# Patient Record
Sex: FEMALE | URBAN - METROPOLITAN AREA
[De-identification: names, ages, dates, MRNs, and addresses within clinical notes are randomized per-mention and may not be internally consistent; named-entity substitution may affect disease eponyms.]

---

## 2020-10-17 ENCOUNTER — APPOINTMENT (OUTPATIENT)
Dept: GENERAL RADIOLOGY | Age: 85
DRG: 291 | End: 2020-10-17
Payer: MEDICARE

## 2020-10-17 ENCOUNTER — APPOINTMENT (OUTPATIENT)
Dept: CT IMAGING | Age: 85
DRG: 291 | End: 2020-10-17
Payer: MEDICARE

## 2020-10-17 ENCOUNTER — HOSPITAL ENCOUNTER (INPATIENT)
Age: 85
LOS: 6 days | Discharge: HOME HEALTH | DRG: 291 | End: 2020-10-23
Attending: EMERGENCY MEDICINE | Admitting: INTERNAL MEDICINE
Payer: MEDICARE

## 2020-10-17 PROBLEM — R41.82 ALTERED MENTAL STATE: Status: ACTIVE | Noted: 2020-10-17

## 2020-10-17 LAB
ALBUMIN SERPL-MCNC: 4.1 G/DL (ref 3.4–5)
ALP BLD-CCNC: 118 U/L (ref 40–129)
ALT SERPL-CCNC: 15 U/L (ref 10–40)
ANION GAP SERPL CALCULATED.3IONS-SCNC: 14 MMOL/L (ref 3–16)
AST SERPL-CCNC: 26 U/L (ref 15–37)
BACTERIA: ABNORMAL /HPF
BASE EXCESS VENOUS: 7.1 MMOL/L (ref -2–3)
BASOPHILS ABSOLUTE: 0 K/UL (ref 0–0.2)
BASOPHILS RELATIVE PERCENT: 0.4 %
BILIRUB SERPL-MCNC: 0.5 MG/DL (ref 0–1)
BILIRUBIN DIRECT: <0.2 MG/DL (ref 0–0.3)
BILIRUBIN URINE: NEGATIVE
BILIRUBIN, INDIRECT: NORMAL MG/DL (ref 0–1)
BLOOD, URINE: ABNORMAL
BUN BLDV-MCNC: 15 MG/DL (ref 7–20)
CALCIUM SERPL-MCNC: 9.4 MG/DL (ref 8.3–10.6)
CARBOXYHEMOGLOBIN: 1.9 % (ref 0–1.5)
CHLORIDE BLD-SCNC: 89 MMOL/L (ref 99–110)
CLARITY: CLEAR
CO2: 27 MMOL/L (ref 21–32)
COLOR: YELLOW
CREAT SERPL-MCNC: 1.2 MG/DL (ref 0.6–1.2)
EOSINOPHILS ABSOLUTE: 0 K/UL (ref 0–0.6)
EOSINOPHILS RELATIVE PERCENT: 0.1 %
EPITHELIAL CELLS, UA: ABNORMAL /HPF (ref 0–5)
GFR AFRICAN AMERICAN: 51
GFR NON-AFRICAN AMERICAN: 42
GLUCOSE BLD-MCNC: 126 MG/DL (ref 70–99)
GLUCOSE URINE: NEGATIVE MG/DL
HCO3 VENOUS: 31.2 MMOL/L (ref 24–28)
HCT VFR BLD CALC: 41 % (ref 36–48)
HEMOGLOBIN, VEN, REDUCED: 21.6 %
HEMOGLOBIN: 13.9 G/DL (ref 12–16)
INR BLD: 1.07 (ref 0.86–1.14)
KETONES, URINE: NEGATIVE MG/DL
LACTIC ACID: 1.9 MMOL/L (ref 0.4–2)
LEUKOCYTE ESTERASE, URINE: NEGATIVE
LYMPHOCYTES ABSOLUTE: 0.4 K/UL (ref 1–5.1)
LYMPHOCYTES RELATIVE PERCENT: 5.6 %
MAGNESIUM: 1.9 MG/DL (ref 1.8–2.4)
MCH RBC QN AUTO: 30.9 PG (ref 26–34)
MCHC RBC AUTO-ENTMCNC: 33.8 G/DL (ref 31–36)
MCV RBC AUTO: 91.5 FL (ref 80–100)
METHEMOGLOBIN VENOUS: 0.5 % (ref 0–1.5)
MICROSCOPIC EXAMINATION: YES
MONOCYTES ABSOLUTE: 0.9 K/UL (ref 0–1.3)
MONOCYTES RELATIVE PERCENT: 12.4 %
NEUTROPHILS ABSOLUTE: 5.8 K/UL (ref 1.7–7.7)
NEUTROPHILS RELATIVE PERCENT: 81.5 %
NITRITE, URINE: NEGATIVE
O2 SAT, VEN: 78 %
PCO2, VEN: 42.7 MMHG (ref 41–51)
PDW BLD-RTO: 13.5 % (ref 12.4–15.4)
PH UA: 7.5 (ref 5–8)
PH VENOUS: 7.48 (ref 7.35–7.45)
PLATELET # BLD: 310 K/UL (ref 135–450)
PMV BLD AUTO: 8.7 FL (ref 5–10.5)
PO2, VEN: 42.8 MMHG (ref 25–40)
POTASSIUM REFLEX MAGNESIUM: 3.4 MMOL/L (ref 3.5–5.1)
PRO-BNP: 8504 PG/ML (ref 0–449)
PROTEIN UA: NEGATIVE MG/DL
PROTHROMBIN TIME: 12.4 SEC (ref 10–13.2)
RBC # BLD: 4.48 M/UL (ref 4–5.2)
RBC UA: ABNORMAL /HPF (ref 0–4)
SODIUM BLD-SCNC: 130 MMOL/L (ref 136–145)
SPECIFIC GRAVITY UA: 1.02 (ref 1–1.03)
TCO2 CALC VENOUS: 33 MMOL/L
TOTAL PROTEIN: 7.8 G/DL (ref 6.4–8.2)
TROPONIN: 0.03 NG/ML
URINE TYPE: ABNORMAL
UROBILINOGEN, URINE: 0.2 E.U./DL
WBC # BLD: 7.1 K/UL (ref 4–11)
WBC UA: ABNORMAL /HPF (ref 0–5)

## 2020-10-17 PROCEDURE — 1200000000 HC SEMI PRIVATE

## 2020-10-17 PROCEDURE — 70450 CT HEAD/BRAIN W/O DYE: CPT

## 2020-10-17 PROCEDURE — 93005 ELECTROCARDIOGRAM TRACING: CPT | Performed by: STUDENT IN AN ORGANIZED HEALTH CARE EDUCATION/TRAINING PROGRAM

## 2020-10-17 PROCEDURE — 84443 ASSAY THYROID STIM HORMONE: CPT

## 2020-10-17 PROCEDURE — 83605 ASSAY OF LACTIC ACID: CPT

## 2020-10-17 PROCEDURE — 6360000004 HC RX CONTRAST MEDICATION: Performed by: STUDENT IN AN ORGANIZED HEALTH CARE EDUCATION/TRAINING PROGRAM

## 2020-10-17 PROCEDURE — 71045 X-RAY EXAM CHEST 1 VIEW: CPT

## 2020-10-17 PROCEDURE — 83880 ASSAY OF NATRIURETIC PEPTIDE: CPT

## 2020-10-17 PROCEDURE — 74177 CT ABD & PELVIS W/CONTRAST: CPT

## 2020-10-17 PROCEDURE — 84484 ASSAY OF TROPONIN QUANT: CPT

## 2020-10-17 PROCEDURE — 81001 URINALYSIS AUTO W/SCOPE: CPT

## 2020-10-17 PROCEDURE — 83735 ASSAY OF MAGNESIUM: CPT

## 2020-10-17 PROCEDURE — 85025 COMPLETE CBC W/AUTO DIFF WBC: CPT

## 2020-10-17 PROCEDURE — 73521 X-RAY EXAM HIPS BI 2 VIEWS: CPT

## 2020-10-17 PROCEDURE — 85610 PROTHROMBIN TIME: CPT

## 2020-10-17 PROCEDURE — 82803 BLOOD GASES ANY COMBINATION: CPT

## 2020-10-17 PROCEDURE — 80048 BASIC METABOLIC PNL TOTAL CA: CPT

## 2020-10-17 PROCEDURE — 80076 HEPATIC FUNCTION PANEL: CPT

## 2020-10-17 PROCEDURE — 99285 EMERGENCY DEPT VISIT HI MDM: CPT

## 2020-10-17 RX ORDER — PROMETHAZINE HYDROCHLORIDE 12.5 MG/1
12.5 TABLET ORAL EVERY 6 HOURS PRN
Status: DISCONTINUED | OUTPATIENT
Start: 2020-10-17 | End: 2020-10-23 | Stop reason: HOSPADM

## 2020-10-17 RX ORDER — SODIUM CHLORIDE 0.9 % (FLUSH) 0.9 %
10 SYRINGE (ML) INJECTION PRN
Status: DISCONTINUED | OUTPATIENT
Start: 2020-10-17 | End: 2020-10-23 | Stop reason: HOSPADM

## 2020-10-17 RX ORDER — POTASSIUM CHLORIDE 20 MEQ/1
20 TABLET, EXTENDED RELEASE ORAL 2 TIMES DAILY WITH MEALS
Status: COMPLETED | OUTPATIENT
Start: 2020-10-18 | End: 2020-10-18

## 2020-10-17 RX ORDER — FUROSEMIDE 10 MG/ML
20 INJECTION INTRAMUSCULAR; INTRAVENOUS 2 TIMES DAILY
Status: DISCONTINUED | OUTPATIENT
Start: 2020-10-18 | End: 2020-10-18

## 2020-10-17 RX ORDER — SODIUM CHLORIDE 0.9 % (FLUSH) 0.9 %
10 SYRINGE (ML) INJECTION EVERY 12 HOURS SCHEDULED
Status: DISCONTINUED | OUTPATIENT
Start: 2020-10-18 | End: 2020-10-23 | Stop reason: HOSPADM

## 2020-10-17 RX ORDER — ATORVASTATIN CALCIUM 20 MG/1
20 TABLET, FILM COATED ORAL NIGHTLY
Status: DISCONTINUED | OUTPATIENT
Start: 2020-10-18 | End: 2020-10-23 | Stop reason: HOSPADM

## 2020-10-17 RX ORDER — ONDANSETRON 2 MG/ML
4 INJECTION INTRAMUSCULAR; INTRAVENOUS EVERY 6 HOURS PRN
Status: DISCONTINUED | OUTPATIENT
Start: 2020-10-17 | End: 2020-10-23 | Stop reason: HOSPADM

## 2020-10-17 RX ORDER — ACETAMINOPHEN 325 MG/1
650 TABLET ORAL EVERY 6 HOURS PRN
Status: DISCONTINUED | OUTPATIENT
Start: 2020-10-17 | End: 2020-10-23 | Stop reason: HOSPADM

## 2020-10-17 RX ORDER — POLYETHYLENE GLYCOL 3350 17 G/17G
17 POWDER, FOR SOLUTION ORAL DAILY PRN
Status: DISCONTINUED | OUTPATIENT
Start: 2020-10-17 | End: 2020-10-23 | Stop reason: HOSPADM

## 2020-10-17 RX ORDER — ACETAMINOPHEN 650 MG/1
650 SUPPOSITORY RECTAL EVERY 6 HOURS PRN
Status: DISCONTINUED | OUTPATIENT
Start: 2020-10-17 | End: 2020-10-23 | Stop reason: HOSPADM

## 2020-10-17 RX ADMIN — IOPAMIDOL 80 ML: 755 INJECTION, SOLUTION INTRAVENOUS at 19:12

## 2020-10-17 NOTE — ED NOTES
Bed: A09-09  Expected date:   Expected time:   Means of arrival:   Comments:  Manjula Gallardo RN  10/17/20 5723

## 2020-10-17 NOTE — ED TRIAGE NOTES
HX OF DEMENTIA  Lives with her dtr. Per EMS report dtr went out for \"a couple hours\". upon arrival home, she found patient supine on the floor, patient said she slid to floor. Patient denies injury. Slow to follow commands, oriented X2. Denies pain, denies injury.  BS by [phone]

## 2020-10-17 NOTE — ED PROVIDER NOTES
[address] [facility] RESIDENT NOTE       Date of evaluation: 10/17/2020    Chief Complaint     Fall and Altered Mental Status      History of Present Illness     Elyssa Baxter is a 80 y.o. female medical history of hypothyroidism, chronic kidney disease, hypertension who presents to the emergency department after a suspected fall. Patient lives with her daughter, who was out of the house when patient fell. Per report, patient has been falling more frequently, attempting to stand up from her chair, and falling to the ground. It is unknown if patient sustained head trauma today during this fall as she was found down. She is not on blood thinners. Patient was stood up and ambulated with walker for EMS. She was noted to have a fever to 103 per EMS report. Patient herself is a poor historian, but denies pain at this time. She denies recent cough, vomiting, diarrhea, fevers, chills at home. Daughter arrives at bedside for further collateral information. She describes a gradual decline in patient's level of orientation and ability to care for herself. Daughter describes that patient has been more fatigued than usual. Denies recent symptoms of illness. Describes more frequent falls as above. Daughter is open to beginning discussion about possible care facility for patient. Review of Systems     Please see HPI for pertinent positives and negatives. All other systems reviewed and negative. Past Medical, Surgical, Family, and Social History     She has no past medical history on file. She has no past surgical history on file. Her family history is not on file. She     Medications     Previous Medications    No medications on file       Allergies     She is allergic to aspirin; food; and penicillins.     Physical Exam     INITIAL VITALS: BP: (!) 181/90, Temp: 98.8 °F (37.1 °C), Pulse: 93, Resp: 22, SpO2: 97 %   Physical Exam  HENT:      Head: Normocephalic and atraumatic. Eyes:      Extraocular Movements: Extraocular movements intact. Pupils: Pupils are equal, round, and reactive to light. Neck:      Musculoskeletal: Neck supple. Comments: No c spine tenderness to palpation  Cardiovascular:      Rate and Rhythm: Normal rate and regular rhythm. Pulmonary:      Effort: Pulmonary effort is normal.      Breath sounds: Normal breath sounds. Abdominal:      General: There is no distension. Palpations: Abdomen is soft. Tenderness: There is no abdominal tenderness. Comments: Soft, mobile mass noted extending into right labial area. No overlying erythema   Musculoskeletal:      Comments: Extensive bruising in the bilateral buttocks area extending into the perineum and upper thigh areas. Associated cellulitic skin changes with warmth and redness. Tenderness to lateral compression of the hips. Neurological:      Mental Status: She is alert. Cranial Nerves: No cranial nerve deficit or facial asymmetry. Sensory: No sensory deficit. Motor: No weakness. Comments: Oriented to self but not hospital setting, year, month. Daughter reports this is baseline. DiagnosticResults     EKG   Interpreted in conjunction with emergencydepartment physician Ana Siddiqui MD  Rhythm: normal sinus   Rate: normal  Axis: normal  Ectopy: PACs  Conduction: LVH  ST Segments: normal  T Waves:normal  Q Waves: none  Clinical Impression: left ventricular hypertrophy  Comparison:  None available    RADIOLOGY:  CT Head WO Contrast   Final Result      1. No acute intracranial process. 2.  Moderate cerebral atrophy and mild chronic small vessel ischemic disease. XR CHEST PORTABLE   Final Result      Right pleural effusion and right lower lobe atelectasis as well as possible left upper lobe collapse. XR HIP BILATERAL W AP PELVIS (2 VIEWS)   Final Result   Impression:    No acute osseous injury. Severe left hip osteoarthritis. CT CHEST ABDOMEN PELVIS W CONTRAST    (Results Pending)       LABS:   Results for orders placed or performed during the hospital encounter of 10/17/20   CBC Auto Differential   Result Value Ref Range    WBC 7.1 4.0 - 11.0 K/uL    RBC 4.48 4.00 - 5.20 M/uL    Hemoglobin 13.9 12.0 - 16.0 g/dL    Hematocrit 41.0 36.0 - 48.0 %    MCV 91.5 80.0 - 100.0 fL    MCH 30.9 26.0 - 34.0 pg    MCHC 33.8 31.0 - 36.0 g/dL    RDW 13.5 12.4 - 15.4 %    Platelets 818 239 - 181 K/uL    MPV 8.7 5.0 - 10.5 fL    Neutrophils % 81.5 %    Lymphocytes % 5.6 %    Monocytes % 12.4 %    Eosinophils % 0.1 %    Basophils % 0.4 %    Neutrophils Absolute 5.8 1.7 - 7.7 K/uL    Lymphocytes Absolute 0.4 (L) 1.0 - 5.1 K/uL    Monocytes Absolute 0.9 0.0 - 1.3 K/uL    Eosinophils Absolute 0.0 0.0 - 0.6 K/uL    Basophils Absolute 0.0 0.0 - 0.2 K/uL   Basic Metabolic Panel w/ Reflex to MG   Result Value Ref Range    Sodium 130 (L) 136 - 145 mmol/L    Potassium reflex Magnesium 3.4 (L) 3.5 - 5.1 mmol/L    Chloride 89 (L) 99 - 110 mmol/L    CO2 27 21 - 32 mmol/L    Anion Gap 14 3 - 16    Glucose 126 (H) 70 - 99 mg/dL    BUN 15 7 - 20 mg/dL    CREATININE 1.2 0.6 - 1.2 mg/dL    GFR Non-African American 42 (A) >60    GFR  51 (A) >60    Calcium 9.4 8.3 - 10.6 mg/dL   Hepatic Function Panel   Result Value Ref Range    Total Protein 7.8 6.4 - 8.2 g/dL    Alb 4.1 3.4 - 5.0 g/dL    Alkaline Phosphatase 118 40 - 129 U/L    ALT 15 10 - 40 U/L    AST 26 15 - 37 U/L    Total Bilirubin 0.5 0.0 - 1.0 mg/dL    Bilirubin, Direct <0.2 0.0 - 0.3 mg/dL    Bilirubin, Indirect see below 0.0 - 1.0 mg/dL   Troponin   Result Value Ref Range    Troponin 0.03 (H) <0.01 ng/mL   Brain Natriuretic Peptide   Result Value Ref Range    Pro-BNP 8,504 (H) 0 - 449 pg/mL   Urinalysis, reflex to microscopic   Result Value Ref Range    Color, UA Yellow Straw/Yellow    Clarity, UA Clear Clear    Glucose, Ur Negative Negative mg/dL    Bilirubin Urine Negative Negative    Ketones, Urine Negative Negative mg/dL    Specific Gravity, UA 1.020 1.005 - 1.030    Blood, Urine TRACE-INTACT (A) Negative    pH, UA 7.5 5.0 - 8.0    Protein, UA Negative Negative mg/dL    Urobilinogen, Urine 0.2 <2.0 E.U./dL    Nitrite, Urine Negative Negative    Leukocyte Esterase, Urine Negative Negative    Microscopic Examination YES     Urine Type Other    Lactic Acid, Plasma   Result Value Ref Range    Lactic Acid 1.9 0.4 - 2.0 mmol/L   Blood Gas, Venous   Result Value Ref Range    pH, Ruddy 7.476 (H) 7.350 - 7.450    pCO2, Ruddy 42.7 41.0 - 51.0 mmHg    pO2, Ruddy 42.8 (H) 25.0 - 40.0 mmHg    HCO3, Venous 31.2 (H) 24.0 - 28.0 mmol/L    Base Excess, Ruddy 7.1 (H) -2.0 - 3.0 mmol/L    O2 Sat, Ruddy 78 Not established %    Carboxyhemoglobin 1.9 (H) 0.0 - 1.5 %    MetHgb, Ruddy 0.5 0.0 - 1.5 %    TC02 (Calc), Ruddy 33 mmol/L    Hemoglobin, Ruddy, Reduced 21.60 %   Protime-INR   Result Value Ref Range    Protime 12.4 10.0 - 13.2 sec    INR 1.07 0.86 - 1.14   Magnesium   Result Value Ref Range    Magnesium 1.90 1.80 - 2.40 mg/dL   Microscopic Urinalysis   Result Value Ref Range    WBC, UA 3-5 0 - 5 /HPF    RBC, UA 3-4 0 - 4 /HPF    Epithelial Cells, UA 2-5 0 - 5 /HPF    Bacteria, UA Rare (A) None Seen /HPF       ED BEDSIDE ULTRASOUND:  Brief bedside ultrasound performed to assess mobile mass in right labial area; consistent with hernia. RECENT VITALS:  BP: (!) 167/88, Temp: 98.8 °F (37.1 °C), Pulse: 93,Resp: 22, SpO2: 97 %     Procedures     None    ED Course     Nursing Notes, Past Medical Hx, Past Surgical Hx, Social Hx, Allergies, and Family Hx were reviewed. The patient was given the followingmedications:  Orders Placed This Encounter   Medications    iopamidol (ISOVUE-370) 76 % injection 80 mL       CONSULTS:  None    MEDICAL DECISION MAKING / ASSESSMENT / Uli Thompson is a 80 y.o. female with past medical history of hypothyroidism, CKD, dementia who presents to the emergency department after a fall.   Patient reportedly had a fever to 103 Fahrenheit for EMS, but was afebrile via oral and rectal temperature upon arrival here. Patient denied complaints, but was a poor historian and only oriented to self. Daughter described a progressive decline in orientation and ability to care for herself. Altered mental status work-up was obtained. Lactate was negative. VBG demonstrates respiratory alkalosis. CBC is without leukocytosis or anemia. NT proBNP is elevated to 8500; no priors available for comparison, and Care Everywhere review shows no TTE since 2003. Troponin is 0.03. An EKG demonstrates LVH without acute ischemia or infarction. Patient demonstrates hyponatremia and hypokalemia on BMP. Creatinine appears to be at baseline of 1.2. Hip x-ray demonstrates severe hip osteoarthrosis but no acute fracture. Portable chest x-ray demonstrates right pleural effusion with associated right lower lobe atelectasis as well as possible left upper lobe collapse. CT head demonstrates moderate atrophy but no acute intracranial hemorrhage. Given abnormal findings on chest x-ray and concern for possible mass or mucous plugging causing left upper lobe collapse, CT of the chest was ordered. Given the significant bruising extending into perineum after fall, CT of the abdomen and pelvis is ordered to further assess for traumatic abnormality as well as to assess hernia noted on bedside ultrasound as described above. Patient will require admission given her abnormal findings, troponin of 0.03, elevated BNP, and frequent falls. Discussion regarding care facility will likely need to be performed during this admission as well. This patient was also evaluated by the attending physician. All care plans werediscussed and agreed upon. Clinical Impression     1. Fall, initial encounter        Disposition     PATIENT REFERRED TO:  No follow-up provider specified.     DISCHARGE MEDICATIONS:  New Prescriptions    No medications on file       Sridevi Soto MD  Resident  10/17/20 1970

## 2020-10-17 NOTE — ED PROVIDER NOTES
I assumed care this patient from my colleague Dr. Wandy Canchola. Weekly the history is an 80year-old with history of hypothyroidism, unclear possible heart failure history, history of dementia who had a suspected fall and was found down at home. Was reportedly febrile per EMS, though was not febrile here in the emergency department. Work-up has been notable for a BNP of approximately 9000 without prior comparison, troponin II 0.03, also without prior comparison. Chest x-ray with right-sided pleural effusion questionable atelectasis. Pelvic x-ray notable for prosthetic hip also significant hernia. Physical exam did demonstrate the patient has some possible cellulitic changes in the groin area. At this time a CT of the chest abdomen and pelvis is pending, the patient is likely to be admitted. CT scan ultimately did not show any obvious acute pathology, but given patient's advanced age, BNP elevation without known baseline, and found down, will admit the patient at this time to the hospitalist for further monitoring, evaluation and treatment.       ED Course as of Oct 17 2035   Sat Oct 17, 2020   2016 Potassium(!): 3.4 [BB]   2016 Sodium(!): 130 [BB]      ED Course User Index  [BB] MD Jax Finnegan MD  7:23 PM  10/17/2020       Jax Garcia MD  Resident  10/17/20 9135

## 2020-10-18 LAB
ANION GAP SERPL CALCULATED.3IONS-SCNC: 10 MMOL/L (ref 3–16)
BASOPHILS ABSOLUTE: 0 K/UL (ref 0–0.2)
BASOPHILS RELATIVE PERCENT: 0.6 %
BUN BLDV-MCNC: 14 MG/DL (ref 7–20)
CALCIUM SERPL-MCNC: 9.2 MG/DL (ref 8.3–10.6)
CHLORIDE BLD-SCNC: 91 MMOL/L (ref 99–110)
CO2: 33 MMOL/L (ref 21–32)
CREAT SERPL-MCNC: 1.3 MG/DL (ref 0.6–1.2)
EKG ATRIAL RATE: 85 BPM
EKG ATRIAL RATE: 92 BPM
EKG DIAGNOSIS: NORMAL
EKG DIAGNOSIS: NORMAL
EKG P AXIS: 31 DEGREES
EKG P AXIS: 49 DEGREES
EKG P-R INTERVAL: 152 MS
EKG P-R INTERVAL: 204 MS
EKG Q-T INTERVAL: 386 MS
EKG Q-T INTERVAL: 402 MS
EKG QRS DURATION: 100 MS
EKG QRS DURATION: 98 MS
EKG QTC CALCULATION (BAZETT): 477 MS
EKG QTC CALCULATION (BAZETT): 478 MS
EKG R AXIS: -5 DEGREES
EKG R AXIS: 1 DEGREES
EKG T AXIS: 47 DEGREES
EKG T AXIS: 52 DEGREES
EKG VENTRICULAR RATE: 85 BPM
EKG VENTRICULAR RATE: 92 BPM
EOSINOPHILS ABSOLUTE: 0 K/UL (ref 0–0.6)
EOSINOPHILS RELATIVE PERCENT: 0.1 %
GFR AFRICAN AMERICAN: 47
GFR NON-AFRICAN AMERICAN: 39
GLUCOSE BLD-MCNC: 94 MG/DL (ref 70–99)
HCT VFR BLD CALC: 36.9 % (ref 36–48)
HEMOGLOBIN: 12.6 G/DL (ref 12–16)
LYMPHOCYTES ABSOLUTE: 0.8 K/UL (ref 1–5.1)
LYMPHOCYTES RELATIVE PERCENT: 16.1 %
MAGNESIUM: 2 MG/DL (ref 1.8–2.4)
MCH RBC QN AUTO: 31 PG (ref 26–34)
MCHC RBC AUTO-ENTMCNC: 34 G/DL (ref 31–36)
MCV RBC AUTO: 91 FL (ref 80–100)
MONOCYTES ABSOLUTE: 1 K/UL (ref 0–1.3)
MONOCYTES RELATIVE PERCENT: 19.7 %
NEUTROPHILS ABSOLUTE: 3.2 K/UL (ref 1.7–7.7)
NEUTROPHILS RELATIVE PERCENT: 63.5 %
PDW BLD-RTO: 13.7 % (ref 12.4–15.4)
PLATELET # BLD: 257 K/UL (ref 135–450)
PMV BLD AUTO: 8.6 FL (ref 5–10.5)
POTASSIUM REFLEX MAGNESIUM: 3 MMOL/L (ref 3.5–5.1)
RBC # BLD: 4.06 M/UL (ref 4–5.2)
SODIUM BLD-SCNC: 134 MMOL/L (ref 136–145)
TROPONIN: 0.05 NG/ML
TROPONIN: 0.07 NG/ML
TSH REFLEX: 1.79 UIU/ML (ref 0.27–4.2)
WBC # BLD: 5 K/UL (ref 4–11)

## 2020-10-18 PROCEDURE — 93010 ELECTROCARDIOGRAM REPORT: CPT | Performed by: INTERNAL MEDICINE

## 2020-10-18 PROCEDURE — 92610 EVALUATE SWALLOWING FUNCTION: CPT | Performed by: SPEECH-LANGUAGE PATHOLOGIST

## 2020-10-18 PROCEDURE — 85025 COMPLETE CBC W/AUTO DIFF WBC: CPT

## 2020-10-18 PROCEDURE — 2500000003 HC RX 250 WO HCPCS: Performed by: INTERNAL MEDICINE

## 2020-10-18 PROCEDURE — 1200000000 HC SEMI PRIVATE

## 2020-10-18 PROCEDURE — 6370000000 HC RX 637 (ALT 250 FOR IP): Performed by: INTERNAL MEDICINE

## 2020-10-18 PROCEDURE — 93005 ELECTROCARDIOGRAM TRACING: CPT | Performed by: INTERNAL MEDICINE

## 2020-10-18 PROCEDURE — 2580000003 HC RX 258: Performed by: INTERNAL MEDICINE

## 2020-10-18 PROCEDURE — 36415 COLL VENOUS BLD VENIPUNCTURE: CPT

## 2020-10-18 PROCEDURE — 94150 VITAL CAPACITY TEST: CPT

## 2020-10-18 PROCEDURE — 80048 BASIC METABOLIC PNL TOTAL CA: CPT

## 2020-10-18 PROCEDURE — 6360000002 HC RX W HCPCS: Performed by: INTERNAL MEDICINE

## 2020-10-18 PROCEDURE — 84484 ASSAY OF TROPONIN QUANT: CPT

## 2020-10-18 PROCEDURE — 83735 ASSAY OF MAGNESIUM: CPT

## 2020-10-18 RX ORDER — TORSEMIDE 20 MG/1
60 TABLET ORAL
Status: ON HOLD | COMMUNITY
End: 2020-10-21 | Stop reason: HOSPADM

## 2020-10-18 RX ORDER — M-VIT,TX,IRON,MINS/CALC/FOLIC 27MG-0.4MG
1 TABLET ORAL NIGHTLY
COMMUNITY

## 2020-10-18 RX ORDER — POTASSIUM CHLORIDE 20 MEQ/1
40 TABLET, EXTENDED RELEASE ORAL ONCE
Status: COMPLETED | OUTPATIENT
Start: 2020-10-18 | End: 2020-10-18

## 2020-10-18 RX ORDER — LEVOTHYROXINE SODIUM 0.07 MG/1
75 TABLET ORAL DAILY
COMMUNITY

## 2020-10-18 RX ORDER — POTASSIUM CHLORIDE 20 MEQ/1
20 TABLET, EXTENDED RELEASE ORAL 2 TIMES DAILY WITH MEALS
Status: ON HOLD | COMMUNITY
End: 2020-10-21 | Stop reason: HOSPADM

## 2020-10-18 RX ORDER — PRAVASTATIN SODIUM 20 MG
40 TABLET ORAL NIGHTLY
COMMUNITY

## 2020-10-18 RX ADMIN — POTASSIUM CHLORIDE 20 MEQ: 1500 TABLET, EXTENDED RELEASE ORAL at 09:31

## 2020-10-18 RX ADMIN — MICONAZOLE NITRATE: 20 POWDER TOPICAL at 00:03

## 2020-10-18 RX ADMIN — MICONAZOLE NITRATE: 20 POWDER TOPICAL at 09:38

## 2020-10-18 RX ADMIN — ATORVASTATIN CALCIUM 20 MG: 20 TABLET, FILM COATED ORAL at 00:03

## 2020-10-18 RX ADMIN — MICONAZOLE NITRATE: 20 POWDER TOPICAL at 20:05

## 2020-10-18 RX ADMIN — FUROSEMIDE 20 MG: 20 INJECTION, SOLUTION INTRAMUSCULAR; INTRAVENOUS at 09:31

## 2020-10-18 RX ADMIN — Medication 10 ML: at 09:31

## 2020-10-18 RX ADMIN — POTASSIUM CHLORIDE 20 MEQ: 1500 TABLET, EXTENDED RELEASE ORAL at 20:02

## 2020-10-18 RX ADMIN — POTASSIUM CHLORIDE 40 MEQ: 1500 TABLET, EXTENDED RELEASE ORAL at 13:42

## 2020-10-18 RX ADMIN — ENOXAPARIN SODIUM 30 MG: 30 INJECTION SUBCUTANEOUS at 09:31

## 2020-10-18 RX ADMIN — ATORVASTATIN CALCIUM 20 MG: 20 TABLET, FILM COATED ORAL at 20:02

## 2020-10-18 ASSESSMENT — PAIN SCALES - PAIN ASSESSMENT IN ADVANCED DEMENTIA (PAINAD)
TOTALSCORE: 0
BODYLANGUAGE: 0
FACIALEXPRESSION: 0
FACIALEXPRESSION: 0
BREATHING: 0
TOTALSCORE: 0
CONSOLABILITY: 0
NEGVOCALIZATION: 0
BREATHING: 0
BODYLANGUAGE: 0
NEGVOCALIZATION: 0
CONSOLABILITY: 0

## 2020-10-18 ASSESSMENT — PAIN SCALES - GENERAL
PAINLEVEL_OUTOF10: 0

## 2020-10-18 NOTE — H&P
Hospital Medicine History & Physical      PCP: No primary care provider on file. Date of Admission: 10/17/2020    Date of Service: Pt seen/examined on 10/17/2020 and Admitted to Inpatient with expected LOS greater than two midnights due to medical therapy. Chief Complaint: Fall at home      History Of Present Illness:      80 y.o. female who presents after a fall at home. Patient has presented to ED with her daughter with whom she lives. According to the daughter she has stepped out for couple of hours and when she came back she found her mother/patient supine on the floor. Patient states that she slid and fell. Denies any injury. Patient is slow to respond to the questions, but follow commands appropriately, oriented x2 and her mental status seems at baseline. History of dementia per daughter. Patient's blood sugar checked by EMS was 127. Per report patient has been falling more frequently. She was attempting to stand up from the chair and fell to the ground. The fall was unwitnessed and it is unknown with the patient struck her head or not since patient has history of dementia at baseline. Unknown how long she has been on the floor. According to the daughters history patient is not on any blood thinners or antiplatelets. According to the ER notes patient has ambulated with the walker via EMS and found to have a fever of 103 °F.  Afebrile while in ED. Patient is a poor historian due to dementia. Denies any pain at this time. Denies fever, chills, cough, chest or abdominal pain, nausea, vomiting, diarrhea or constipation, urinary symptoms    Past Medical History:      No past medical history on file. Past medical history is not available and patient is not able to provide reliable history. Past Surgical History:      No past surgical history on file. Unable to obtain from the patient due to baseline dementia.     Medications Prior to Admission:      Prior to Admission medications    Not on File   Need to verify from her pharmacy in the morning    Allergies:  Aspirin; Food; and Penicillins    Social History:    TOBACCO:   has no history on file for tobacco.  ETOH:   has no history on file for alcohol. E-Cigarettes Vaping or Juuling     Questions Responses    Vaping Use     Start Date     Does device contain nicotine? Quit Date     Vaping Type         Family History:    Reviewed in detail and negative for DM, CAD, Cancer, CVA. Positive as follows:    No family history on file. REVIEW OF SYSTEMS:   Patient is unable to provide reliable history due to underlying dementia    PHYSICAL EXAM PERFORMED:    BP (!) 167/68   Pulse 96   Temp 98.8 °F (37.1 °C) (Rectal)   Resp 19   SpO2 97%     General appearance:  No apparent distress, appears stated age and cooperative. Afebrile  HEENT:  Normal cephalic, atraumatic without obvious deformity. Pupils equal, round, and reactive to light. Extra ocular muscles intact. Conjunctivae/corneas clear. Neck: Supple, with full range of motion. No jugular venous distention. Trachea midline. Respiratory:  Normal respiratory effort. Clear to auscultation, bilaterally without Rales/Wheezes/Rhonchi. Diminished breath sounds bilateral bases  Cardiovascular:  Regular rate and rhythm with normal S1/S2 without murmurs, rubs or gallops. Abdomen: Soft, non-tender, non-distended with normal bowel sounds. Soft, swelling in suprapubic area extending into the right inguinal area, no overlying erythema. Musculoskeletal:  No clubbing, cyanosis or edema bilaterally. Full range of motion without deformity. Extensive bruising in the bilateral buttocks extending into the perineum and upper thigh areas. ?  Cellulitic skin changes  Skin: Skin color, texture, turgor normal.  Redness over the genital and perineal area and bilateral inguinal areas  Neurologic:  Neurovascularly intact without any focal sensory/motor deficits.  Cranial nerves: II-XII intact, grossly non-focal.  Psychiatric:  Alert and oriented to self but not to the month, year or the place. According to the daughter this is her baseline. Does not interact or follow commands. Capillary Refill: Brisk,< 3 seconds   Peripheral Pulses: +2 palpable, equal bilaterally       Labs:     Recent Labs     10/17/20  1742   WBC 7.1   HGB 13.9   HCT 41.0        Recent Labs     10/17/20  1742   *   K 3.4*   CL 89*   CO2 27   BUN 15   CREATININE 1.2   CALCIUM 9.4     Recent Labs     10/17/20  1742   AST 26   ALT 15   BILIDIR <0.2   BILITOT 0.5   ALKPHOS 118     Recent Labs     10/17/20  1742   INR 1.07     Recent Labs     10/17/20  1742   TROPONINI 0.03*       Urinalysis:      Lab Results   Component Value Date    NITRU Negative 10/17/2020    WBCUA 3-5 10/17/2020    BACTERIA Rare 10/17/2020    RBCUA 3-4 10/17/2020    BLOODU TRACE-INTACT 10/17/2020    SPECGRAV 1.020 10/17/2020    GLUCOSEU Negative 10/17/2020       Radiology:   EKG:  I have reviewed the EKG with the following interpretation: Sinus rhythm, LVH, PACs-occasional, no acute ST-T changes. CT CHEST ABDOMEN PELVIS W CONTRAST   Final Result      CHEST:      1.  Very large hiatal hernia containing nondilated transverse colon and the entire stomach in the right lower chest.   2.  Mild to moderate right lower lobe atelectasis. No evidence of pneumonia. ABDOMEN/PELVIS:      1.  Large right inguinal hernia containing nondilated distal ileum and proximal colon. No evidence of bowel obstruction. 2.  Cholelithiasis. 3.  Right renal cortical atrophy. 4.  Urinary bladder diverticulum. 5.  Colonic diverticulosis. 6.  Chronic L5 compression fracture with retropulsion resulting in severe L4-5 spinal canal stenosis, similar compared to report from outside hospital MR lumbar spine dated 8/16/2019   7. Severe left hip osteoarthritis. No acute fracture. CT Head WO Contrast   Final Result      1. No acute intracranial process.    2. Moderate cerebral atrophy and mild chronic small vessel ischemic disease. XR CHEST PORTABLE   Final Result      Right pleural effusion and right lower lobe atelectasis as well as possible left upper lobe collapse. XR HIP BILATERAL W AP PELVIS (2 VIEWS)   Final Result   Impression:    No acute osseous injury. Severe left hip osteoarthritis. ASSESSMENT:    Active Hospital Problems    Diagnosis Date Noted    Altered mental state [R41.82] 10/17/2020   - Altered mental state-likely her baseline mental status due to dementia, confirmed by the daughter. UA negative for infection. CT head without acute bleed  - Frequent falls, with unwitnessed fall earlier today  - Elevated proBNP  - Elevated troponin, no prior labs available  - Hyponatremia-?   Secondary to diuretics  - Hypokalemia (patient takes torsemide and a statin at home according to the daughter)  - CKD stage III, atrophic right kidney on CT  - Chronic L5 compression fracture  - Large hiatal hernia  - Asymptomatic cholelithiasis  - Severe left hip osteoarthritis  - Large inguinal hernia    PLAN:  - Patient is allergic to aspirin  - Continue on statins  - Pharmacy consult for home medication verification  - Trend cardiac enzymes x2, if trending up will get cardiology consult  - Repeat EKG in a.m.  - Continue Lasix IV at current doses  - Strict intake and output  - CHF team consulted  - Consider getting echocardiogram  - Replace potassium  - Continue to monitor electrolytes and renal function  - Incentive spirometry  - Would not start on IV antibiotics at this time, will continue to follow clinically and if patient spikes fever or develops leukocytosis will start her on antibiotics  - PT/OT  - Neurochecks every 4 hourly  - Fall precautions  - Antifungal powder to bilateral inguinal areas and perineum twice daily    DVT Prophylaxis: Lovenox  Diet: DIET LOW SODIUM 2 GM; 1500 ml  Code Status: Full Code    PT/OT Eval Status: As tolerated, with assistance and fall precautions    Dispo -PCU with telemetry       Selena Valenzuela MD    Thank you No primary care provider on file. for the opportunity to be involved in this patient's care. If you have any questions or concerns please feel free to contact me at [phone].

## 2020-10-18 NOTE — PROGRESS NOTES
Pt received from ED to room 6312. Vital signs stable; pt alert to self only. Calm, cooperative. Oriented to room and routines. Will apply tele and initiate fall protocols.

## 2020-10-18 NOTE — PLAN OF CARE
Problem: Falls - Risk of:  Goal: Will remain free from falls  Description: Will remain free from falls  10/18/2020 1516 by Dawn Pardo RN  Outcome: Ongoing  Note: Patient at risk for falls. Patient resting quietly in bed, A/O x 1. Side rails up x 3. Bed locked in lowest position. Bed alarm on. Bedside table and call light within reach. Patient instructed to call for assistance. Patient verbalized understanding. Will continue to monitor. Problem: Skin Integrity:  Goal: Absence of new skin breakdown  Description: Absence of new skin breakdown  10/18/2020 1516 by Dawn Pardo RN  Outcome: Ongoing  Note: Pt at risk for skin breakdown. Skin assessment complete. No signs of new skin breakdown. Preexisting skin issues assessed and documented on (see flowsheets). Pts skin cleansed with inc cleanser, external suction catheter in use. Pt repositioned in bed with pillow support, heels elevated off bed. Will order specialty air mattress and continue to monitor.       Problem: Confusion - Acute:  Goal: Mental status will be restored to baseline  Description: Mental status will be restored to baseline  10/18/2020 1516 by Dawn Pardo RN  Outcome: Ongoing

## 2020-10-18 NOTE — CONSULTS
Clinical Pharmacy Progress Note  Pharmacy to assist with Home Medication information    Admit date: 10/17/2020     Subjective/Objective:  Patient is an 80yr old female from home admitted after a fall from home. Pharmacy has been asked by Dr. Adrienne Caceres to assist with obtaining home medication information. Assessment/Plan:    1. Home Medication information:    · Called the patient's daughter, Robert Do [phone]. LM asking for a callback. Per notes, patient is not a good historian. · Used pharmacy fills (which are all recent), and this corresponds nicely with last PCP Office visit on 7/29/20. Added these to the medication list.    · Home Medication List in Epic is complete. If daughter calls back, I will addend this note with any additional information. Daughter, Robert Do, called back -   All meds entered are correct. Adjusted times as per when daughter gives them. List complete. A Perfect Serve message will be sent to the dayteam to discuss the updated [address] List.      Thank you, please call with questions.    Jimbo Peters PharmD., BCPS   10/18/2020 10:23 AM  Wireless: [phone]

## 2020-10-18 NOTE — PLAN OF CARE
Bedside swallow evaluation completed. Please refer to EMR.     Thank you,    Declan Montalvo) [facility], [place], [place]; [phone]  Speech-Language Pathologist

## 2020-10-18 NOTE — PROGRESS NOTES
Patient's daughter visiting and updated on patient and plan of care. She also brought POA, HPOA, and living will documents; copies made and placed in chart.

## 2020-10-18 NOTE — PROGRESS NOTES
Speech Language Pathology  Facility/Department: [address] [place] [address],[address] EVALUATION    NAME: Jasmin Palm  : 1933  MRN: [id number]    ADMISSION DATE: 10/17/2020  ADMITTING DIAGNOSIS: has Altered mental state on their problem list.  ONSET DATE: 10/17/20    Recent Chest Xray/CT of Chest: (10/17/20)  Impression         CHEST:         1.  Very large hiatal hernia containing nondilated transverse colon and the entire stomach in the right lower chest.    2.  Mild to moderate right lower lobe atelectasis. No evidence of pneumonia. Date of Eval: 10/18/2020  Evaluating Therapist: Kashmir Yang    Current Diet level:  Current Diet : NPO  Current Liquid Diet : NPO      Primary Complaint  Patient Complaint: Xerostomia    Pain:  Pain Assessment  Pain Assessment: 0-10  Pain Level: 0  Patient's Stated Pain Goal: No pain    Reason for Referral  Jasmin Palm was referred for a bedside swallow evaluation to assess the efficiency of her swallow function, identify signs and symptoms of aspiration and make recommendations regarding safe dietary consistencies, effective compensatory strategies, and safe eating environment. Impression  Dysphagia Diagnosis: Mild pharyngeal stage dysphagia; Suspected needs further assessment  Dysphagia Impression : Suspected pharyngeal deficits characterized by inconsistent overt s/s of aspiration w/ single sips of thin liquids only; no s/s w/ sequential swallow or during completion of 3oz water test. No difficulties w/ puree or regular solid consistencies; trace lingual residue x1 that cleared w/ liquid wash. Given inconsistency, recommend instrumental evaluation be completed to fully assess swallow physiology and safest diet consistency. Recommend regular + thin liquids at this time w/ all aspiration precautions including oral care 3-5x daily, upright position, and use of effortful swallow.   Dysphagia Outcome Severity Scale: Level 4: Mild moderate dysphagia- Intermittent supervision/cueing. One - two diet consistencies restricted     Treatment Plan  Requires SLP Intervention: Yes  Duration/Frequency of Treatment: TBD post MBS  D/C Recommendations: 24 hour supervision/assistance  Referral To: Dysphagia evaluation    Recommended Diet and Intervention  Diet Solids Recommendation: Regular  Liquid Consistency Recommendation: Thin  Recommended Form of Meds: Meds in puree  Recommendations: Modified barium swallow study  Therapeutic Interventions: Diet tolerance monitoring;Oral care; Patient/Family education    Compensatory Swallowing Strategies  Compensatory Swallowing Strategies: Eat/Feed slowly;Upright as possible for all oral intake;Effortful swallow    Treatment/Goals  Dysphagia Goals: The patient will tolerate recommended diet without observed clinical signs of aspiration; The patient will tolerate instrumental swallowing procedure; The patient/caregiver will demonstrate understanding of compensatory strategies for improved swallowing safety. General  Chart Reviewed: Yes  Comments: Pt w/ hx of dementia hospitalized d/t mechanical fall and AMS; CT revealed no acute infarct/bleed. No acute infection per UA. Chest x-ray with right-sided pleural effusion questionable atelectasis. Subjective  Subjective: Pt partially reclined, sleeping upon arrival; easily roused and agreeable to evaluation. Resting comfortably on room air. Positioned upright prior to PO. AAOx2 which is baseline for pt per chart review; known to self and location. Behavior/Cognition: Alert; Cooperative;Pleasant mood  Temperature Spikes Noted: No  Respiratory Status: Room air  O2 Device: None (Room air)  Communication Observation: Functional  Follows Directions: Simple  Dentition: Adequate  Patient Positioning: Upright in bed  Baseline Vocal Quality: Normal  Volitional Cough: Strong  Prior Dysphagia History: None per chart review  Consistencies Administered: Reg solid; Dysphagia Pureed (Dysphagia I);Thin - teaspoon; Thin - cup; Thin - straw; Ice Chips      Vision/Hearing  Vision  Vision: Within Functional Limits  Hearing  Hearing: Exceptions to [facility]  Hearing Exceptions: Hard of hearing/hearing concerns    Oral Motor Deficits  Oral/Motor  Oral Motor: Within functional limits(All anatomical structures and movement functional; noted to have thick, ropy secretions coating oral cavity and lips. Oral care completed and secretions removed prior to PO.)    Oral Phase Dysfunction  Oral Phase  Oral Phase: WFL  Oral Phase  Oral Phase - Comment: Oral phase grossly WFLs; adequate and timely mastication w/ both puree and regular solid consistency. Good A&P propulsion and complete clearance of oral cavity; noted to have trace anterior lingual residue post-regular solid that cleared w/ use of liquid wash. Functional labial seal for use of cup and spoon, and intraoral pressure for use of straw. Indicators of Pharyngeal Phase Dysfunction   Pharyngeal Phase  Pharyngeal Phase: Exceptions  Indicators of Pharyngeal Phase Dysfunction  Throat Clearing - Immediate: Thin - cup  Pharyngeal Phase   Pharyngeal: Pt presents w/ suspected pharyngeal dysphagia characterized by inconsistent overt s/s of aspiration w/ thin liquids via sequential swallow only. Pt noted to have immediate throat clear 1/3 trials via tsp, 2/6 trials via cup edge, and 1/2 via straw. However, no overt s/s of aspiration when consuming thin liquids via sequential swallow; completed 3oz water test w/o difficulties. No coughing t/o evaluation. No observed deficits w/ puree or regular solid. Prognosis  Prognosis  Prognosis for safe diet advancement: fair  Barriers to reach goals: age;cognitive deficits;fatigue  Individuals consulted  Consulted and agree with results and recommendations: Patient;RN    Education  Patient Education: Educated pt to reasoning behind evaluation, observations, diet recommendation, and instrumental evaluation.   Patient Education Response: Needs reinforcement  Safety Devices in place: Yes  Type of devices:  All fall risk precautions in place;Call light within reach       Therapy Time  SLP Individual Minutes  Time In: 0840  Time Out: [facility]  Minutes: 18     SLP Total Treatment Time  Timed Code Treatment Minutes: 0 Minutes  Total Treatment Time: 25    Thank you,    Leopoldo Mealing) [place], [place], Terri Wyatt; [phone]  Speech-Language Pathologist

## 2020-10-18 NOTE — ED NOTES
Report called to Blanche Campbell receiving RN for [id number]  PCT asked to transport     Elin Alejandra RN  10/17/20 2890

## 2020-10-18 NOTE — PROGRESS NOTES
Hospitalist Progress Note      PCP: No primary care provider on file. Date of Admission: 10/17/2020    Chief Complaint: [facility] Course: 77-year-old female presented to the hospital after a fall at home    Subjective: No acute events reported overnight. Patient knows that she is in the hospital but does not know why. Does not have any concerns or issues      Medications:  Reviewed    Infusion Medications   Scheduled Medications    potassium chloride  40 mEq Oral Once    sodium chloride flush  10 mL Intravenous 2 times per day    enoxaparin  30 mg Subcutaneous Daily    furosemide  20 mg Intravenous BID    atorvastatin  20 mg Oral Nightly    potassium chloride  20 mEq Oral BID WC    miconazole   Topical BID     PRN Meds: sodium chloride flush, acetaminophen **OR** acetaminophen, polyethylene glycol, promethazine **OR** ondansetron      Intake/Output Summary (Last 24 hours) at 10/18/2020 1316  Last data filed at 10/18/2020 0528  Gross per 24 hour   Intake --   Output 500 ml   Net -500 ml       Physical Exam Performed:    BP (!) 143/66   Pulse 82   Temp 98.3 °F (36.8 °C) (Oral)   Resp 16   Ht 5' 8\" (1.727 m)   Wt 194 lb 3.6 oz (88.1 kg)   SpO2 98%   BMI 29.53 kg/m²     General appearance: No apparent distress, appears stated age and cooperative. HEENT: Pupils equal, round, and reactive to light. Conjunctivae/corneas clear. Neck: Supple, with full range of motion. No jugular venous distention. Trachea midline. Respiratory:  Normal respiratory effort. Clear to auscultation, bilaterally without Rales/Wheezes/Rhonchi. Cardiovascular: Regular rate and rhythm with normal S1/S2 without murmurs, rubs or gallops. Abdomen: Soft, non-tender, non-distended with normal bowel sounds. Musculoskeletal: Extensive bruising noted over the buttock area and perineum  Skin: Skin color, texture, turgor normal.  No rashes or lesions.   Neurologic: Generalized weakness, nonfocal exam  Psychiatric: Alert well as possible left upper lobe collapse. XR HIP BILATERAL W AP PELVIS (2 VIEWS)   Final Result   Impression:    No acute osseous injury. Severe left hip osteoarthritis.                  Assessment/Plan:    Active Hospital Problems    Diagnosis    Altered mental state [R41.82]     Fall  Generalized weakness/debility  Hyponatremia  Hypokalemia  Dementia  Elevated troponin  Dyslipidemia    Plan:  -Fall precautions, PT/OT  -Replace potassium and recheck  -Continue Lipitor      DVT Prophylaxis: Lovenox  Diet: DIET LOW SODIUM 2 GM; 1500 ml  Code Status: Full Code    PT/OT Eval Status: Consulted    Dispo-pending clinical course    Whitney Mcardle, MD

## 2020-10-19 ENCOUNTER — APPOINTMENT (OUTPATIENT)
Dept: CT IMAGING | Age: 85
DRG: 291 | End: 2020-10-19
Payer: MEDICARE

## 2020-10-19 ENCOUNTER — APPOINTMENT (OUTPATIENT)
Dept: GENERAL RADIOLOGY | Age: 85
DRG: 291 | End: 2020-10-19
Payer: MEDICARE

## 2020-10-19 LAB
ALBUMIN SERPL-MCNC: 3.4 G/DL (ref 3.4–5)
ANION GAP SERPL CALCULATED.3IONS-SCNC: 11 MMOL/L (ref 3–16)
BACTERIA: ABNORMAL /HPF
BASOPHILS ABSOLUTE: 0 K/UL (ref 0–0.2)
BASOPHILS RELATIVE PERCENT: 0.9 %
BILIRUBIN URINE: NEGATIVE
BLOOD, URINE: NEGATIVE
BUN BLDV-MCNC: 16 MG/DL (ref 7–20)
CALCIUM SERPL-MCNC: 8.5 MG/DL (ref 8.3–10.6)
CHLORIDE BLD-SCNC: 94 MMOL/L (ref 99–110)
CLARITY: CLEAR
CO2: 28 MMOL/L (ref 21–32)
COLOR: YELLOW
CREAT SERPL-MCNC: 1.2 MG/DL (ref 0.6–1.2)
EOSINOPHILS ABSOLUTE: 0 K/UL (ref 0–0.6)
EOSINOPHILS RELATIVE PERCENT: 0.2 %
EPITHELIAL CELLS, UA: ABNORMAL /HPF (ref 0–5)
GFR AFRICAN AMERICAN: 51
GFR NON-AFRICAN AMERICAN: 42
GLUCOSE BLD-MCNC: 157 MG/DL (ref 70–99)
GLUCOSE URINE: NEGATIVE MG/DL
HCT VFR BLD CALC: 36.6 % (ref 36–48)
HEMOGLOBIN: 12.1 G/DL (ref 12–16)
KETONES, URINE: NEGATIVE MG/DL
LEUKOCYTE ESTERASE, URINE: ABNORMAL
LYMPHOCYTES ABSOLUTE: 0.7 K/UL (ref 1–5.1)
LYMPHOCYTES RELATIVE PERCENT: 21.7 %
MAGNESIUM: 1.9 MG/DL (ref 1.8–2.4)
MCH RBC QN AUTO: 30.4 PG (ref 26–34)
MCHC RBC AUTO-ENTMCNC: 33 G/DL (ref 31–36)
MCV RBC AUTO: 92.1 FL (ref 80–100)
MICROSCOPIC EXAMINATION: YES
MONOCYTES ABSOLUTE: 0.3 K/UL (ref 0–1.3)
MONOCYTES RELATIVE PERCENT: 10.1 %
NEUTROPHILS ABSOLUTE: 2.3 K/UL (ref 1.7–7.7)
NEUTROPHILS RELATIVE PERCENT: 67.1 %
NITRITE, URINE: NEGATIVE
OSMOLALITY URINE: 498 MOSM/KG (ref 390–1070)
OSMOLALITY: 289 MOSM/KG (ref 278–305)
PDW BLD-RTO: 13.9 % (ref 12.4–15.4)
PH UA: 7 (ref 5–8)
PHOSPHORUS: 2.5 MG/DL (ref 2.5–4.9)
PLATELET # BLD: 229 K/UL (ref 135–450)
PMV BLD AUTO: 8.9 FL (ref 5–10.5)
POTASSIUM SERPL-SCNC: 3.1 MMOL/L (ref 3.5–5.1)
PRO-BNP: 2453 PG/ML (ref 0–449)
PROTEIN UA: ABNORMAL MG/DL
RBC # BLD: 3.97 M/UL (ref 4–5.2)
RBC UA: ABNORMAL /HPF (ref 0–4)
SODIUM BLD-SCNC: 133 MMOL/L (ref 136–145)
SODIUM URINE: <20 MMOL/L
SPECIFIC GRAVITY UA: 1.01 (ref 1–1.03)
TROPONIN: 0.04 NG/ML
URINE TYPE: ABNORMAL
UROBILINOGEN, URINE: 0.2 E.U./DL
WBC # BLD: 3.4 K/UL (ref 4–11)
WBC UA: ABNORMAL /HPF (ref 0–5)

## 2020-10-19 PROCEDURE — 6360000002 HC RX W HCPCS: Performed by: INTERNAL MEDICINE

## 2020-10-19 PROCEDURE — 84484 ASSAY OF TROPONIN QUANT: CPT

## 2020-10-19 PROCEDURE — 83880 ASSAY OF NATRIURETIC PEPTIDE: CPT

## 2020-10-19 PROCEDURE — 81001 URINALYSIS AUTO W/SCOPE: CPT

## 2020-10-19 PROCEDURE — 85025 COMPLETE CBC W/AUTO DIFF WBC: CPT

## 2020-10-19 PROCEDURE — 2580000003 HC RX 258: Performed by: INTERNAL MEDICINE

## 2020-10-19 PROCEDURE — 83735 ASSAY OF MAGNESIUM: CPT

## 2020-10-19 PROCEDURE — 97162 PT EVAL MOD COMPLEX 30 MIN: CPT

## 2020-10-19 PROCEDURE — 6370000000 HC RX 637 (ALT 250 FOR IP): Performed by: INTERNAL MEDICINE

## 2020-10-19 PROCEDURE — 74230 X-RAY XM SWLNG FUNCJ C+: CPT

## 2020-10-19 PROCEDURE — 97530 THERAPEUTIC ACTIVITIES: CPT

## 2020-10-19 PROCEDURE — 80069 RENAL FUNCTION PANEL: CPT

## 2020-10-19 PROCEDURE — 84300 ASSAY OF URINE SODIUM: CPT

## 2020-10-19 PROCEDURE — 72125 CT NECK SPINE W/O DYE: CPT

## 2020-10-19 PROCEDURE — 92611 MOTION FLUOROSCOPY/SWALLOW: CPT

## 2020-10-19 PROCEDURE — 94150 VITAL CAPACITY TEST: CPT

## 2020-10-19 PROCEDURE — 83930 ASSAY OF BLOOD OSMOLALITY: CPT

## 2020-10-19 PROCEDURE — 2500000003 HC RX 250 WO HCPCS: Performed by: INTERNAL MEDICINE

## 2020-10-19 PROCEDURE — 83935 ASSAY OF URINE OSMOLALITY: CPT

## 2020-10-19 PROCEDURE — 1200000000 HC SEMI PRIVATE

## 2020-10-19 RX ORDER — POTASSIUM CHLORIDE 20 MEQ/1
40 TABLET, EXTENDED RELEASE ORAL ONCE
Status: COMPLETED | OUTPATIENT
Start: 2020-10-19 | End: 2020-10-19

## 2020-10-19 RX ORDER — LABETALOL HYDROCHLORIDE 5 MG/ML
10 INJECTION, SOLUTION INTRAVENOUS EVERY 4 HOURS PRN
Status: DISCONTINUED | OUTPATIENT
Start: 2020-10-19 | End: 2020-10-23 | Stop reason: HOSPADM

## 2020-10-19 RX ORDER — MAGNESIUM SULFATE IN WATER 40 MG/ML
2 INJECTION, SOLUTION INTRAVENOUS ONCE
Status: COMPLETED | OUTPATIENT
Start: 2020-10-19 | End: 2020-10-19

## 2020-10-19 RX ORDER — POTASSIUM CHLORIDE 20 MEQ/1
20 TABLET, EXTENDED RELEASE ORAL ONCE
Status: COMPLETED | OUTPATIENT
Start: 2020-10-19 | End: 2020-10-19

## 2020-10-19 RX ADMIN — MAGNESIUM SULFATE HEPTAHYDRATE 2 G: 40 INJECTION, SOLUTION INTRAVENOUS at 15:40

## 2020-10-19 RX ADMIN — Medication 10 ML: at 20:05

## 2020-10-19 RX ADMIN — POTASSIUM CHLORIDE 40 MEQ: 1500 TABLET, EXTENDED RELEASE ORAL at 15:39

## 2020-10-19 RX ADMIN — LABETALOL HYDROCHLORIDE 10 MG: 5 INJECTION, SOLUTION INTRAVENOUS at 20:58

## 2020-10-19 RX ADMIN — Medication 10 ML: at 08:54

## 2020-10-19 RX ADMIN — MICONAZOLE NITRATE: 20 POWDER TOPICAL at 20:06

## 2020-10-19 RX ADMIN — POTASSIUM CHLORIDE 20 MEQ: 1500 TABLET, EXTENDED RELEASE ORAL at 20:05

## 2020-10-19 RX ADMIN — MICONAZOLE NITRATE: 20 POWDER TOPICAL at 08:54

## 2020-10-19 RX ADMIN — ATORVASTATIN CALCIUM 20 MG: 20 TABLET, FILM COATED ORAL at 20:05

## 2020-10-19 RX ADMIN — ENOXAPARIN SODIUM 30 MG: 30 INJECTION SUBCUTANEOUS at 08:53

## 2020-10-19 ASSESSMENT — PAIN SCALES - GENERAL
PAINLEVEL_OUTOF10: 0

## 2020-10-19 ASSESSMENT — PAIN SCALES - PAIN ASSESSMENT IN ADVANCED DEMENTIA (PAINAD)
TOTALSCORE: 0
CONSOLABILITY: 0
BODYLANGUAGE: 0
FACIALEXPRESSION: 0
NEGVOCALIZATION: 0
BREATHING: 0

## 2020-10-19 NOTE — PLAN OF CARE
Nutrition Problem #1: Increased nutrient needs  Intervention: Food and/or Nutrient Delivery: Continue Current Diet, Start Oral Nutrition Supplement(diet per SLP)  Nutritional Goals: Pt to tolerate the most appropriate form of nutrition therapy to meet nutritional needs

## 2020-10-19 NOTE — PLAN OF CARE
Problem: Falls - Risk of:  Goal: Will remain free from falls  Description: Will remain free from falls  Note: Pt at high risk of falls d/t confusion and generalized weakness. PT/OT consulted. Pt ambulate with 2 person assist and bird dee. Bed locked in lowest position. 3/4 Bed rails up. Call light within reach. Pt belongings within reach. Bedside table within reach. Pt instructed to use call light prior to getting up. Will continue to monitor. Problems: Nutrition  Goal: Optimal nutrition therapy    Note: Pt consume >50% of all meals and drinks during this shift. 1500ml fluid restriction. SLP consulted and modified barium swallow test performed today.

## 2020-10-19 NOTE — PROCEDURES
INSTRUMENTAL SWALLOW REPORT  MODIFIED BARIUM SWALLOW/DC    NAME: Leland Clark   : 1933  MRN: [id number]       Date of Eval: 10/19/2020     Ordering Physician: Dr. Rina Finn  Radiologist: Dr. Maricela Carrel     Referring Diagnosis(es): Referring Diagnosis: altered mental status    Past Medical History:  has no past medical history on file. Past Surgical History:  has no past surgical history on file. Current Diet Solid Consistency: Regular  Current Diet Liquid Consistency: Thin  Type of Study: Initial MBS     Recent Chest Xray/CT of Chest: (10/17/20)  Impression         CHEST:         1.  Very large hiatal hernia containing nondilated transverse colon and the entire stomach in the right lower chest.    2.  Mild to moderate right lower lobe atelectasis. No evidence of pneumonia.         Patient Complaints/Reason for Referral:  Leland Clark was referred for a MBS to assess the efficiency of his/her swallow function, assess for aspiration, and to make recommendations regarding safe dietary consistencies, effective compensatory strategies, and safe eating environment. Patient complaints: none    Onset of problem:   Date of Onset: 10/17/2020    Behavior/Cognition/Vision/Hearing:  Behavior/Cognition: Alert; Cooperative  Vision: Within Functional Limits  Hearing: Exceptions to [facility]  Hearing Exceptions: Hard of hearing/hearing concerns    Impressions:  Swallow WFL's. Swallow is timely and efficient. Flash penetration with spontaneous clearing occurred with thin via cup and straw, with small sips (normal for pt age). With consecutive swallows, no penetration occurred. No aspiration or pharyngeal residue was identified. Mastication of solids is adequate with effective clearance of oral cavity. Treatment Dx and ICD 10: dysphagia   Patient Position: Lateral and Patient Degrees: 90  Consistencies Administered: Reg solid; Dysphagia Pureed (Dysphagia I); Thin teaspoon; Thin straw; Thin cup       Dysphagia Outcome Severity Scale: Level 7: Normal in all situations  Penetration-Aspiration Scale (PAS): 2 - Material enters the airway, remains above the vocal folds, and is ejected from the airway    Recommended Diet:  Solid consistency: Regular  Liquid consistency: Thin  Liquid administration via: Cup;Straw    Medication administration: (whole with water, if difficulty place in puree)    Safe Swallow Protocol:  Supervision: Independent  Upright as possible for all oral intake    Recommendations/Treatment  Requires SLP Intervention: Yes  D/C Recommendations: (no follow up indicated)    Recommended Exercises: n/a   Therapeutic Interventions: Patient/Family education    Education: Images and recommendations were reviewed with pt following this exam.   Patient Education: pt educated to results of MBS  Patient Education Response: Verbalizes understanding    Prognosis  Prognosis for safe diet advancement: good  Duration/Frequency of Treatment  Duration/Frequency of Treatment: no follow up indicated      Goals:    1-The patient will tolerate recommended diet without observed clinical signs of aspiration  10/19: goal dc due to MBS results    2-The patient will tolerate instrumental swallowing procedure  10/19: goal met    3-The patient/caregiver will demonstrate understanding of compensatory strategies for improved swallowing safety. 10/19:  Pt educated to results of MBS and recommendations. Pt stated comprehension  Goal met      Oral Preparation / Oral Phase  Oral Phase: WFL    Pharyngeal Phase  Swallow is timely and efficient. Flash penetration with spontaneous clearing occurred with thin via cup and straw, with small sips (normal for pt age). With consecutive swallows, no penetration occurred. No aspiration or pharyngeal residue was identified.     Esophageal Phase  Bolus cleared UES effectively    Pain   Patient Currently in Pain: No    Therapy Time:   Individual Concurrent Group Co-treatment   Time In 1220         Time Out 1239 Minutes 19            Plan:  Diet recommendation: cont regular diet/thin liquids  Dc recommendation: no follow up indicated      James Styles M.S./[facility]3]-SLP #[id number]  Pg. # [id number]  Needs met prior to leaving dept.  Results d/w YOVANI Choe    10/19/2020, 12:49 PM

## 2020-10-19 NOTE — PROGRESS NOTES
Physical Therapy    Facility/Department: [address]  Initial Assessment and treatment     NAME: Dena Aragon  : 1933  MRN: [id number]    Date of Service: 10/19/2020    Discharge Recommendations:  Dena Aragon scored a / on the AM-PAC short mobility form. Current research shows that an AM-PAC score of 17 or less is typically not associated with a discharge to the patient's home setting. Based on the patient's AM-PAC score and their current functional mobility deficits, it is recommended that the patient have 3-5 sessions per week of Physical Therapy at d/c to increase the patient's independence. Please see assessment section for further patient specific details. If patient discharges prior to next session this note will serve as a discharge summary. Please see below for the latest assessment towards goals. PT Equipment Recommendations  Other: defer to next level of care    Assessment   Body structures, Functions, Activity limitations: Decreased functional mobility ; Decreased strength;Decreased cognition;Decreased balance  Assessment: Pt is a 81 y/o woman who presented to [facility] with [facility]MS on 10/17 after a fall at home. Pt's cognitive status greatly limits pt's functional mobility at this time. Pt requires Max A for bed mobility and 2 person assist for transfers with RW. Pt requires max VCs and encouragement from PT and daughter for initiation of mobility. Pt lives with daughter who works full-time and was previous independent with mobility. Pt is functioning well below baseline and would benefit from continue IP PT to improve safety and independence with mobility. Discussed with dtr who would prefer to take pt home but is unable to provide 24hr care. Dtr interested in what options are available. If 24hr hr not available, will need continued IP PT. Dtr in agreement. Case management notified.    Treatment Diagnosis: impaired functional mobility with gait and transfers  Prognosis: Fair  Decision Making: Medium Complexity  PT Education: Family Education;PT Role;Plan of Care; Adaptive Device Training;Functional Mobility Training;Transfer Training  Patient Education: Pt demonstrates limited understanding d/t cogntive status. Pt's daughter demonstrates and verbalizes understanding. REQUIRES PT FOLLOW UP: Yes  Activity Tolerance  Activity Tolerance: Patient limited by cognitive status       Patient Diagnosis(es): The encounter diagnosis was Fall, initial encounter. has no past medical history on file. has no past surgical history on file. Restrictions  Position Activity Restriction  Other position/activity restrictions: up as tolerated  Vision/Hearing  Vision: Impaired  Vision Exceptions: Wears glasses for reading  Hearing: Exceptions to [facility]  Hearing Exceptions: Hard of hearing/hearing concerns     Subjective  General  Chart Reviewed: Yes  Patient assessed for rehabilitation services?: Yes  Additional Pertinent Hx: Pt is a 81 y/o female who presented to [facility] on 10/17 with AMS after a fall at home. 10/17 XR bilateral Hip/pelvis (-) acute fx, CT head (-) acute bleed, CT chest/abd/pelvis (+) hiatal hernia, inguinal hernia, mild right lower lobe atelectasis, CT spine (-) acute fx. PMHx: hypothyroidism, chronic kidney disease, hypertension  Family / Caregiver Present: Yes(Daughter)  Referring Practitioner: Linus Ramon MD  Referral Date : 10/17/20  Diagnosis: Altered mental status  Follows Commands: Impaired(Pt required Max cues and encouragement for mobility, pt improved with following commands from daughter)  Subjective  Subjective: Pt found supine in bed with daughter present. Agreeable to PT. Daughter provided majority of subjective history d/t pt's memory impairment. Pain Screening  Patient Currently in Pain: Other (comment)(Pt displays LE discomfort, pt's daughter reports pt has sensitive LEs, was not able to rate.  Pt positioned for comfort at end of session.)  Vital Signs  Patient Currently in Pain: Other (comment)(Pt displays LE discomfort, pt's daughter reports pt has sensitive LEs, was not able to rate. Pt positioned for comfort at end of session.)       Orientation  Orientation  Overall Orientation Status: Impaired  Orientation Level: Oriented to person;Disoriented to time;Oriented to place  Social/Functional History  Social/Functional History  Lives With: Daughter  Type of Home: House  Home Layout: Multi-level(live in the 1st floor)  Home Access: Stairs to enter with rails  Entrance Stairs - Number of Steps: 4-5 steps  Entrance Stairs - Rails: Left  Bathroom Shower/Tub: Walk-in shower(walk in shower with small threshold)  Bathroom Toilet: Handicap height  Bathroom Equipment: Commode, Hand-held shower  Bathroom Accessibility: (Chavez Gomez does not fit in bathroom)  Home Equipment: Rolling walker, [employer], [employer], Reacher, Sock aid, Long-handled shoehorn  Receives Help From: Family  ADL Assistance: Needs assistance(daughter assists with dressing in the evening, bathing)  Homemaking Assistance: Needs assistance  Ambulation Assistance: Independent(with RW)  Transfer Assistance: Needs assistance(getting out of chair)  Active : No(daughter drives)  Occupation: Retired  Additional Comments: 3 falls over the last 8 months, daughter works 36 hrs/wk but is available to assist pt in the evenings, pt lives in daughter's house  Cognition        Objective          AROM RLE (degrees)  RLE AROM: WFL  AROM LLE (degrees)  LLE AROM : WFL  Strength RLE  Comment: 3+/5 hip flexion, 4-/5 knee extension, 4-/5 DF  Strength LLE  Comment: 3+/5 hip flexion, 4-/5 knee extension, 4-/5 DF        Bed mobility  Rolling to Right: Maximum assistance  Supine to Sit: Maximum assistance  Scooting: Maximal assistance  Comment: HOB elevated, bed rail up. Pt require max encouragement from PT and daughter to initiate mobility.  Pt required max A x 1 to sit on EOB for UE guidance, BLEs and trunk stability. Cognition limited pt from following directions. Transfers  Sit to Stand: 2 Person Assistance;Dependent/Total(Mod A x 1 from EOB to RW x 2 trials)  Stand to sit: 2 Person Assistance;Dependent/Total(Mod A x 2 from RW to EOB and RW to chair)  Comment: Pt required PT and daughter assist from tranfers. Daughter provided strategy used at home for transfers. Pt requires encouragement from daughter to initiate mobility. Pt demos limited receptivity to PT instruction. Max verbal cues needed for UE placement and RW management  Ambulation  Ambulation?: Yes  Ambulation 1  Device: Rolling Walker  Assistance: Moderate assistance  Quality of Gait: Pt demos decreased aranza and step length, pt requires max VCs to guide stepping to chair with RW, assist required for RW management. Distance: 3 ft  Comments: Pt's ambulation distances greatly limited by cognitive status. Stairs/Curb  Stairs?: No     Balance  Sitting - Static: Fair;-(Min A)  Sitting - Dynamic: Poor(Mod A)  Standing - Static: Poor(Mod A with RW)  Standing - Dynamic: Poor(Mod A with RW)  Comments: Pt required continuous assist to correct posterior lean in sitting. Pt unable to maintain upright posture correction for longer than 10-15 sec.         Plan   Plan  Times per week: 2-5  Times per day: Daily  Current Treatment Recommendations: Strengthening, Balance Training, Endurance Training, Gait Training, Stair training, Functional Mobility Training, Transfer Training, Home Exercise Program  Safety Devices  Type of devices: Left in chair, Call light within reach, Chair alarm in place, Gait belt, Nurse notified - rec Denzel dee for back to bed - RN aware    G-Code       OutComes Score                                                  AM-PAC Score  AM-PAC Inpatient Mobility Raw Score : 9 (10/19/20 1618)  AM-PAC Inpatient T-Scale Score : 30.55 (10/19/20 1618)  Mobility Inpatient CMS 0-100% Score: 81.38 (10/19/20 1618)  Mobility Inpatient CMS G-Code Modifier : CM (10/19/20 5672)          Goals  Short term goals  Time Frame for Short term goals: discharge  Short term goal 1: supine <> sit CGA  Short term goal 2: sit <> stand Min A RW  Short term goal 3: Pt will ambulate 20ft with RW SBA  Patient Goals   Patient goals : to return to daughter's home       Therapy Time   Individual Concurrent Group Co-treatment   Time In 1456         Time Out 1537         Minutes 41               Timed Code Treatment Minutes:   26    Total Treatment Minutes:  [facility] , SPT    Therapist was present, directed the patient's care, made skilled judgment, and was responsible for assessment and treatment of the patient.     Andres Barry, PT, DPT  [phone]

## 2020-10-19 NOTE — CARE COORDINATION
Case Management Assessment           Initial Evaluation                Date / Time of Evaluation: 10/19/2020 4:33 PM                 Assessment Completed by: Gildardo Garcia    Patient Name: Randi Duncan     YOB: 1933  Diagnosis: Altered mental state [R41.82]  Altered mental state [R41.82]     Date / Time: 10/17/2020  4:53 PM    Patient Admission Status: Inpatient     CM  Met with pt and her daughter  Rosalia Hitchcock at Bedside,  Pt lives at home with daughter   pta   No services  Currently in the home , agreeable to  COA  If  Qualifies  , she  Previously did not  Meet guidelines and with medicaid was also over income . She is open to all options at d/c  , SNF list  Provided  ,  PT OT working with pt : And  Provided with [employer]  Duty  Resources  Should she decide to take her  Home :  She is  Working with her employer to be able to work from home  1/2 days when no one is available to be [facility] er. She  Will discuss with her brother  Lilibeth Mckeon  And follow up in the AM , with CM . Electronically signed by Gildardo Garcia RN on 10/19/2020 at 4:36 PM          If patient is discharged prior to next notation, then this note serves as note for discharge by case management. Current PCP: No primary care provider on file.   Clinic Patient: No    Chart Reviewed: Yes  Patient/ Family Interviewed: Yes    Initial assessment completed at bedside with: pt and daughter: Rosalia Hitchcock:      Hospitalization in the last 30 days: No    Emergency Contacts:  Extended Emergency Contact Information  Primary Emergency Contact: unknown, unknown  Home Phone: [phone]  Relation: Unknown    Advance Directives:   Code Status: Full Code    Healthcare Power of : No  Agent: NA NA  Contact Number: NA    Copy present: No     In paper Chart: No    Scanned into EMR No    Financial  Payor: MEDICARE / Plan: MEDICARE PART A AND B / Product Type: *No Product type* /     Pre-cert required for SNF: No    Pharmacy  No Pharmacies Listed    Potential assistance Purchasing Medications: Potential Assistance Purchasing Medications: No(Pt answers no, but is confused)  Does Patient want to participate in local refill/ meds to beds program?: Not Assessed    Meds To Beds General Rules:  1. Can ONLY be done Monday- Friday between 8:30am-5pm  2. Prescription(s) must be in pharmacy by 3pm to be filled same day  3. Copy of patient's insurance/ prescription drug card and patient face sheet must be sent along with the prescription(s)  4. Cost of Rx cannot be added to hospital bill. If financial assistance is needed, please contact unit  or ;  or  CANNOT provide pharmacy voucher for patients co-pays  5.  Patients can then  the prescription on their way out of the hospital at discharge, or pharmacy can deliver to the bedside if staff is available. (payment due at time of pick-up or delivery - cash, check, or card accepted)     Able to afford home medications/ co-pay costs: Yes    ADLS  Support Systems: Children    PT AM-PAC: 9 /24  OT AM-PAC:   /24    HOUSING  Home Environment: Home w/ daughter   Steps:   Entrance Stairs - Number of Steps: 4-5 steps    Lives With: Daughter  Type of Home: House  Home Layout: Multi-level(live in the 1st floor)  Home Access: Stairs to enter with rails  Entrance Stairs - Number of Steps: 4-5 steps  Entrance Stairs - Rails: Left  Bathroom Shower/Tub: Walk-in shower(walk in shower with small threshold)  Bathroom Toilet: Handicap height  Bathroom Equipment: Commode, Hand-held shower  Bathroom Accessibility: Thien Dent does not fit in bathroom)  Home Equipment: Rolling walker, [employer], [employer], Reacher, Sock aid, Long-handled shoehorn  Receives Help From: Family  ADL Assistance: Needs assistance(daughter assists with dressing in the evening, bathing)  Homemaking Assistance: Needs assistance  Ambulation Assistance: Independent(with RW)  Transfer Assistance: Needs [phone]

## 2020-10-19 NOTE — PROGRESS NOTES
NUTRITION ASSESSMENT  Admission Date: 10/17/2020     Type and Reason for Visit: Initial, Positive Nutrition Screen    NUTRITION RECOMMENDATIONS:   1. PO Diet: Recommend diet textures and liquid consistencies per SLP recommendations   2. ONS: Start ONS s/p MBS results     NUTRITION ASSESSMENT:  Pt is nutritionally compromised AEB unstageable wound and wt loss. Pt admitted for fall at home, AMS. Hx of hypothyroidism, chronic kidney disease, and hypertension. Noted pt w/difficulty in terms of c/s. MBS planned for today. Wt loss of 8% x 3 months per EMR. Recommend start of ONS w/diet orders per SLP. Will monitor MBS results and nutritional status this admission. MALNUTRITION ASSESSMENT  Context of Malnutrition: Acute Illness   Malnutrition Status: At risk for malnutrition (Comment)  Findings of the 6 clinical characteristics of malnutrition (Minimum of 2 out of 6 clinical characteristics is required to make the diagnosis of moderate or severe Protein Calorie Malnutrition based on AND/ASPEN Guidelines):  Energy Intake: Unable to Assess    Energy Intake Time: Unable to assess   Weight Loss %: 7.5% loss or greater    Weight loss Time: Greater than or equal to 3 months   Fluid Accumulation: No significant    Fluid Accumulation Location: No significant     Strength: Not Performed;  Not Measured     NUTRITION DIAGNOSIS   Problem: Problem #1: Predicted inadequate energy intake  Etiology: Insufficient energy/nutrient consumption  Signs & Symptoms: Weight loss     NUTRITION INTERVENTION  Food and/or Nutrient Delivery:Continue Current diet  or Start ONS (diet per MBS results)  Nutrition education/counseling/coordination of care: Continue Inpatient Monitoring     NUTRITION MONITORING & EVALUATION:  Evaluation:Goals set   Goals:Goals: Pt to tolerate the most appropriate form of nutrition therapy to meet nutritional needs   Monitoring: Chewing/Swallowing , Meal Intake , Supplement Intake  or Weight      OBJECTIVE DATA:  · Nutrition-Focused Physical Findings: +1 BLE edema, AMS  · Wounds Multiple, Unstageable and DTI     No past medical history on file. ANTHROPOMETRICS  Current Height: 5' 8\" (172.7 cm)  Current Weight: 194 lb 0.1 oz (88 kg)    Admission weight: 194 lb 10.7 oz (88.3 kg)  Ideal Bodyweight 140 lb   Weight Changes -8 %, 16 lb x 3 months per EMR        BMI BMI (Calculated): 29.6    Wt Readings from Last 50 Encounters:   10/19/20 194 lb 0.1 oz (88 kg)       COMPARATIVE STANDARDS  Estimated Total Kcals/Day : 18-22 Current Bodyweight (88 kg) 5030-0694 kcal    Estimated Total Protein (g/day) : 1.2-1.4 Ideal Bodyweight  (64 kg) 77-90 g/day  Estimated Daily Total Fluid (ml/day): 1 mL/kcal per day     Food / Nutrition-Related History  Pre-Admission / Home Diet:  Pre-Admission/Home Diet: General   Home Supplements / Herbals:    none noted  Food Restrictions / Cultural Requests:    none noted    Current Nutrition Therapies   DIET LOW SODIUM 2 GM; 1500 ml     PO Intake: no meals yet  PO Supplement: None   PO Supplement Intake: None   IVF: none     NUTRITION RISK LEVEL: Risk Level:  Moderate     Alida Castellanos, 66 [address]  [place]:  [phone]  Office:  [phone]

## 2020-10-19 NOTE — PLAN OF CARE
Problem: Falls - Risk of:  Goal: Will remain free from falls  Description: Will remain free from falls  10/19/2020 0204 by Gonzales Cast RN  Outcome: Ongoing  Note: Pt does not attempt to get out of bed; requires max assistance of 2 to turn side to side. Problem: Skin Integrity:  Goal: Will show no infection signs and symptoms  Description: Will show no infection signs and symptoms  10/19/2020 0204 by Gonzales Cast RN  Outcome: Ongoing  Note: No signs or symptoms of infection. Problem: Confusion - Acute:  Goal: Absence of continued neurological deterioration signs and symptoms  Description: Absence of continued neurological deterioration signs and symptoms  10/19/2020 0204 by Gonzales Cast RN  Outcome: Ongoing  Note: Pt is alert to self only, but family states this is baseline for her. Problem: Mood - Altered:  Goal: Mood stable  Description: Mood stable  10/19/2020 0204 by Gonzales Cast RN  Outcome: Ongoing  Note: Pt is calm, pleasant, and cooperative when awake, but sleeps most of the time. Problem: Sleep Pattern Disturbance:  Goal: Appears well-rested  Description: Appears well-rested  10/19/2020 0204 by Gonzales Cast RN  Outcome: Ongoing  Note: Pt is sleeping well; sleeps the majority of the time.

## 2020-10-20 LAB
ALBUMIN SERPL-MCNC: 3.3 G/DL (ref 3.4–5)
ANION GAP SERPL CALCULATED.3IONS-SCNC: 10 MMOL/L (ref 3–16)
BUN BLDV-MCNC: 15 MG/DL (ref 7–20)
CALCIUM SERPL-MCNC: 8.7 MG/DL (ref 8.3–10.6)
CHLORIDE BLD-SCNC: 95 MMOL/L (ref 99–110)
CO2: 29 MMOL/L (ref 21–32)
CREAT SERPL-MCNC: 1 MG/DL (ref 0.6–1.2)
GFR AFRICAN AMERICAN: >60
GFR NON-AFRICAN AMERICAN: 52
GLUCOSE BLD-MCNC: 108 MG/DL (ref 70–99)
LV EF: 58 %
LVEF MODALITY: NORMAL
MAGNESIUM: 2.1 MG/DL (ref 1.8–2.4)
PHOSPHORUS: 2.8 MG/DL (ref 2.5–4.9)
POTASSIUM SERPL-SCNC: 3.7 MMOL/L (ref 3.5–5.1)
SODIUM BLD-SCNC: 134 MMOL/L (ref 136–145)

## 2020-10-20 PROCEDURE — 36415 COLL VENOUS BLD VENIPUNCTURE: CPT

## 2020-10-20 PROCEDURE — 80069 RENAL FUNCTION PANEL: CPT

## 2020-10-20 PROCEDURE — 6370000000 HC RX 637 (ALT 250 FOR IP): Performed by: INTERNAL MEDICINE

## 2020-10-20 PROCEDURE — 1200000000 HC SEMI PRIVATE

## 2020-10-20 PROCEDURE — 6360000004 HC RX CONTRAST MEDICATION: Performed by: INTERNAL MEDICINE

## 2020-10-20 PROCEDURE — 83735 ASSAY OF MAGNESIUM: CPT

## 2020-10-20 PROCEDURE — 94150 VITAL CAPACITY TEST: CPT

## 2020-10-20 PROCEDURE — 6360000002 HC RX W HCPCS: Performed by: INTERNAL MEDICINE

## 2020-10-20 PROCEDURE — 2580000003 HC RX 258: Performed by: INTERNAL MEDICINE

## 2020-10-20 PROCEDURE — C8929 TTE W OR WO FOL WCON,DOPPLER: HCPCS

## 2020-10-20 RX ORDER — FUROSEMIDE 40 MG/1
40 TABLET ORAL DAILY
Status: DISCONTINUED | OUTPATIENT
Start: 2020-10-20 | End: 2020-10-23 | Stop reason: HOSPADM

## 2020-10-20 RX ORDER — FUROSEMIDE 20 MG/1
20 TABLET ORAL DAILY
Status: DISCONTINUED | OUTPATIENT
Start: 2020-10-20 | End: 2020-10-20

## 2020-10-20 RX ORDER — POTASSIUM CHLORIDE 20 MEQ/1
40 TABLET, EXTENDED RELEASE ORAL ONCE
Status: COMPLETED | OUTPATIENT
Start: 2020-10-20 | End: 2020-10-20

## 2020-10-20 RX ADMIN — FUROSEMIDE 40 MG: 40 TABLET ORAL at 14:46

## 2020-10-20 RX ADMIN — MICONAZOLE NITRATE: 20 POWDER TOPICAL at 21:50

## 2020-10-20 RX ADMIN — MICONAZOLE NITRATE: 20 POWDER TOPICAL at 10:20

## 2020-10-20 RX ADMIN — PERFLUTREN 1.65 MG: 6.52 INJECTION, SUSPENSION INTRAVENOUS at 12:12

## 2020-10-20 RX ADMIN — Medication 10 ML: at 21:51

## 2020-10-20 RX ADMIN — POTASSIUM CHLORIDE 40 MEQ: 1500 TABLET, EXTENDED RELEASE ORAL at 15:39

## 2020-10-20 RX ADMIN — Medication 10 ML: at 10:19

## 2020-10-20 RX ADMIN — ENOXAPARIN SODIUM 30 MG: 30 INJECTION SUBCUTANEOUS at 10:19

## 2020-10-20 RX ADMIN — ATORVASTATIN CALCIUM 20 MG: 20 TABLET, FILM COATED ORAL at 21:50

## 2020-10-20 ASSESSMENT — PAIN SCALES - GENERAL
PAINLEVEL_OUTOF10: 0

## 2020-10-20 NOTE — PROGRESS NOTES
Hospitalist Progress Note      PCP: No primary care provider on file. Date of Admission: 10/17/2020    Chief Complaint: [facility] Course: 77-year-old female with history of dementia presented to the hospital after a fall at home. Lives at home with daughter. As per admission note daughter stepped out for a couple of hours and when she came back she found her mother to be in supine position on the ground. Reports she slipped and fell. It was unwitnessed. It is reported that she has been frequently falling. According to the ER notes patient has ambulated with the walker via EMS and found to have a fever of 103 °F.  Afebrile while in ED. Information from Daughter Justice Hebert -- Patient has been living with her daughter since April 2020  Has had multiple falls since then. Daughter tells me that she sits in a rocking chair and goes back and forth multiple times and sometimes she slips and skids onto the floor in the chair goes backwards. She does not feel that patient loses consciousness but not sure as mostly these falls are unwitnessed. There are reports of leg swelling with weeping for the last few months. Patient uses a walker at baseline but has been progressively getting weaker the last few weeks or months. She is able to dress herself in the morning but needs help with dressing in the evening, daughter does the cooking rest of the ADLs for the patient.     Subjective:   Patient seen and examined  Feels ok, denies chest pain, shortness of breath      Medications:  Reviewed    Infusion Medications   Scheduled Medications    sodium chloride flush  10 mL Intravenous 2 times per day    enoxaparin  30 mg Subcutaneous Daily    atorvastatin  20 mg Oral Nightly    miconazole   Topical BID     PRN Meds: perflutren lipid microspheres, labetalol, sodium chloride flush, acetaminophen **OR** acetaminophen, polyethylene glycol, promethazine **OR** ondansetron      Intake/Output Summary (Last 24 hours) at 10/20/2020 1041  Last data filed at 10/19/2020 1732  Gross per 24 hour   Intake 340 ml   Output 350 ml   Net -10 ml       Physical Exam Performed:    BP (!) 150/80   Pulse 77   Temp 97.5 °F (36.4 °C) (Oral)   Resp 18   Ht 5' 8\" (1.727 m)   Wt 194 lb 0.1 oz (88 kg)   SpO2 96%   BMI 29.50 kg/m²     General appearance: No apparent distress, appears stated age and cooperative. HEENT: Pupils equal, round, and reactive to light. Conjunctivae/corneas clear. Neck: Supple, with full range of motion. No jugular venous distention. Trachea midline. Respiratory:  Normal respiratory effort. Crackles at the bases  Cardiovascular: Regular rate and rhythm with normal S1/S2 without murmurs, rubs or gallops. Abdomen: Soft, non-tender, non-distended with normal bowel sounds.   Musculoskeletal: Extensive bruising noted over the buttock area and perineum  Skin: Lower extremity venous stasis changes, trace edema  Neurologic: Generalized weakness, nonfocal exam  Psychiatric: Alert oriented to self and place  Capillary Refill: Brisk,< 3 seconds   Peripheral Pulses: +2 palpable, equal bilaterally       Labs:   Recent Labs     10/17/20  1742 10/18/20  0556 10/19/20  1223   WBC 7.1 5.0 3.4*   HGB 13.9 12.6 12.1   HCT 41.0 36.9 36.6    257 229     Recent Labs     10/17/20  1742 10/18/20  0556 10/19/20  1223   * 134* 133*   K 3.4* 3.0* 3.1*   CL 89* 91* 94*   CO2 27 33* 28   BUN 15 14 16   CREATININE 1.2 1.3* 1.2   CALCIUM 9.4 9.2 8.5   PHOS  --   --  2.5     Recent Labs     10/17/20  1742   AST 26   ALT 15   BILIDIR <0.2   BILITOT 0.5   ALKPHOS 118     Recent Labs     10/17/20  1742   INR 1.07     Recent Labs     10/18/20  0013 10/18/20  0556 10/19/20  1223   TROPONINI 0.07* 0.05* 0.04*       Urinalysis:      Lab Results   Component Value Date    NITRU Negative 10/19/2020    WBCUA 0-2 10/19/2020    BACTERIA Rare 10/19/2020    RBCUA 0-2 10/19/2020    BLOODU Negative 10/19/2020    SPECGRAV 1.015 10/19/2020    GLUCOSEU Negative 10/19/2020       Radiology:  CT CERVICAL SPINE WO CONTRAST   Final Result   1. No acute findings. 2. Degenerative changes contributing to central canal foraminal narrowing as detailed above. FL MODIFIED BARIUM SWALLOW W VIDEO   Final Result      As above. CT CHEST ABDOMEN PELVIS W CONTRAST   Final Result      CHEST:      1.  Very large hiatal hernia containing nondilated transverse colon and the entire stomach in the right lower chest.   2.  Mild to moderate right lower lobe atelectasis. No evidence of pneumonia. ABDOMEN/PELVIS:      1.  Large right inguinal hernia containing nondilated distal ileum and proximal colon. No evidence of bowel obstruction. 2.  Cholelithiasis. 3.  Right renal cortical atrophy. 4.  Urinary bladder diverticulum. 5.  Colonic diverticulosis. 6.  Chronic L5 compression fracture with retropulsion resulting in severe L4-5 spinal canal stenosis, similar compared to report from outside hospital MR lumbar spine dated 8/16/2019   7. Severe left hip osteoarthritis. No acute fracture. CT Head WO Contrast   Final Result      1. No acute intracranial process. 2.  Moderate cerebral atrophy and mild chronic small vessel ischemic disease. XR CHEST PORTABLE   Final Result      Right pleural effusion and right lower lobe atelectasis as well as possible left upper lobe collapse. XR HIP BILATERAL W AP PELVIS (2 VIEWS)   Final Result   Impression:    No acute osseous injury. Severe left hip osteoarthritis. Assessment/Plan:    Active Hospital Problems    Diagnosis    Altered mental state [R41.82]     1. Falls at home, reported as mechanical fall, unwitnessed and a possible syncope  2. Acute diastolic heart failure, new diagnosis  3. Episode of tachycardia, SVT HR 150s, 7 secs  4. Generalized weakness/debility  5. Hyponatremia -- due to chf, hypokalemia  6. Electrolyte abnormalities  7. Dementia  8.  Elevated troponin due to demand ischemia, trend flat  9. Dyslipidemia    Plan:  Labs reviewed, bilateral abnormalities including hypokalemia/hyponatremia  We will replace as needed and check renal profile tomorrow  Echo without wall motion abnormalities, with G2DD  Cardiology consulted for evaluation  Lasix 40 mg OD started  Keep K > 4.0, Mag > 2.0  Check orthostatic vitals every shift  Continue Lipitor  Case management on board for assisting with discharge planning    DVT Prophylaxis: Lovenox  Diet: DIET LOW SODIUM 2 GM; 1500 ml  Dietary Nutrition Supplements: Standard High Calorie Oral Supplement  Code Status: Full Code    PT/OT Eval Status:  9/24 on the AM-PAC short mobility form  Pertinent notes from PT/OT -- Pt's cognitive status greatly limits pt's functional mobility at this time. Pt requires Max A for bed mobility and 2 person assist for transfers with RW. Pt requires max VCs and encouragement from PT and daughter for initiation of mobility. Pt lives with daughter who works full-time and was previous independent with mobility. Pt is functioning well below baseline and would benefit from continue IP PT to improve safety and independence with mobility. Discussed with dtr who would prefer to take pt home but is unable to provide 24hr care.      Dispo- continue inpatient, from home  Possibly dc on Wednesday    Mishel Costa MD   Hospitalist

## 2020-10-20 NOTE — CARE COORDINATION
Case Management Assessment           Daily Note                 Date/ Time of Note: 10/20/2020 12:25 PM         Note completed by: Harini Bass    Patient Name: Adelita Masterson  YOB: 1933    Diagnosis:Altered mental state [R41.82]  Altered mental state [R41.82]  Patient Admission Status: Inpatient    Date of Admission:10/17/2020  4:53 PM Length of Stay: 3 GLOS:      Current Plan of Care: admitted  For AMS and a fall.:  ech pending for today :   ________________________________________________________________________________________  PT AM-PAC: 9 / 24 per last evaluation on: 10/19/2020    OT AM-PAC:   / 24 per last evaluation on: 10/19/2020    DME Needs for discharge: defer  ________________________________________________________________________________________  Discharge Plan: Home with [address] [place] [address] Way: w/ Private Duty Vs  SNF      Tentative discharge date: 1-2 days    Current barriers to discharge: medical clearance     Referrals completed: Not Applicable    Resources/ information provided: [facility], [facility]  , and Private  Duty resources  :  Dgtr Enio Brian d/w  Brother  Whet  disposition they are  Preferring .   ________________________________________________________________________________________  Case Management Notes:    CM cont to follow for  D/c planning , pt  From home w/ dgtr  Now  Requiring 24 /7 care  And not in place at this time . Not safe to be  Home alone  . Pt is functioning well below baseline and would benefit from continue IP PT to improve safety and independence with mobility. Discussed with dtr who would prefer to take pt home but is unable to provide 24hr care. Dtr interested in what options are available. If 24hr hr not available, will need continued IP PT. Dtr in agreement    Anish Harkins and her family were provided with choice of provider; she and her family are in agreement with the discharge plan.     Care Transition Patient: Radha Bass, RN  [facility]  Case Management Department  Ph: [phone]

## 2020-10-20 NOTE — PLAN OF CARE
Problem: Falls - Risk of:  Goal: Will remain free from falls  Description: Will remain free from falls  10/20/2020 0246 by Carolin Jameson RN  Outcome: Ongoing  Note: Pt remains free from falls through this shift, alert and oriented x4 with intermittent confusion, fall protocol in place, bed alarm on and in low, locked position, side rails elevated x3, nonskid footwear on. Call light and pt belongings within reach, no attempts to get oob without assistance. Will continue to monitor for pt safety. Problem: Skin Integrity:  Goal: Will show no infection signs and symptoms  Description: Will show no infection signs and symptoms  Outcome: Ongoing  Note: Pt at risk for skin breakdown. Skin assessment as documented. Pts skin cleansed with inc cleanser as needed. Pt repositioned in bed with pillow support. Call light within reach. Will continue to monitor.

## 2020-10-20 NOTE — PLAN OF CARE
Problem: Falls - Risk of:  Goal: Will remain free from falls  Description: Will remain free from falls  10/20/2020 1212 by Manfred Burrell RN  Note: Patient has a history of falls. Fall prevention protocol in place at this time. Patient is in bed with wheels locked, to the lowest position and exit alarm on. Bed side table, call button and personal belongings within reach. Patient educated on use of call button for assistance when needed. Patient verbalized understanding. Will continue to monitor and reassess. Problem: Skin Integrity:  Goal: Will show no infection signs and symptoms  Description: Will show no infection signs and symptoms  10/20/2020 1212 by Manfred Burrell RN  Note: Patient with excoriation and redness to buttocks and faustino area. Micontin powder applied per treatment order. Patient skin cleansed with incontinent barrier per routine schedule. No signs and symptoms of skin infection noted at this time. Will continue to monitor and reassess. Problem: Confusion - Acute:  Goal: Absence of continued neurological deterioration signs and symptoms  Description: Absence of continued neurological deterioration signs and symptoms  Note: Patient alert and oriented to person and place. Patient confused at time but easily reoriented. Will continue to monitor and reassess.

## 2020-10-21 PROBLEM — I10 HYPERTENSION: Status: ACTIVE | Noted: 2020-10-21

## 2020-10-21 PROBLEM — I50.31 ACUTE DIASTOLIC (CONGESTIVE) HEART FAILURE (HCC): Status: ACTIVE | Noted: 2020-10-21

## 2020-10-21 PROBLEM — I34.0 SEVERE MITRAL REGURGITATION: Status: ACTIVE | Noted: 2020-10-21

## 2020-10-21 LAB
ALBUMIN SERPL-MCNC: 3.4 G/DL (ref 3.4–5)
ANION GAP SERPL CALCULATED.3IONS-SCNC: 10 MMOL/L (ref 3–16)
BUN BLDV-MCNC: 13 MG/DL (ref 7–20)
CALCIUM SERPL-MCNC: 8.5 MG/DL (ref 8.3–10.6)
CHLORIDE BLD-SCNC: 97 MMOL/L (ref 99–110)
CO2: 28 MMOL/L (ref 21–32)
CREAT SERPL-MCNC: 1 MG/DL (ref 0.6–1.2)
GFR AFRICAN AMERICAN: >60
GFR NON-AFRICAN AMERICAN: 52
GLUCOSE BLD-MCNC: 89 MG/DL (ref 70–99)
PHOSPHORUS: 2.8 MG/DL (ref 2.5–4.9)
POTASSIUM SERPL-SCNC: 3.6 MMOL/L (ref 3.5–5.1)
SODIUM BLD-SCNC: 135 MMOL/L (ref 136–145)

## 2020-10-21 PROCEDURE — 97530 THERAPEUTIC ACTIVITIES: CPT

## 2020-10-21 PROCEDURE — 6370000000 HC RX 637 (ALT 250 FOR IP): Performed by: INTERNAL MEDICINE

## 2020-10-21 PROCEDURE — 36415 COLL VENOUS BLD VENIPUNCTURE: CPT

## 2020-10-21 PROCEDURE — 97116 GAIT TRAINING THERAPY: CPT

## 2020-10-21 PROCEDURE — 97167 OT EVAL HIGH COMPLEX 60 MIN: CPT

## 2020-10-21 PROCEDURE — 1200000000 HC SEMI PRIVATE

## 2020-10-21 PROCEDURE — 6360000002 HC RX W HCPCS: Performed by: INTERNAL MEDICINE

## 2020-10-21 PROCEDURE — 99222 1ST HOSP IP/OBS MODERATE 55: CPT | Performed by: INTERNAL MEDICINE

## 2020-10-21 PROCEDURE — 97535 SELF CARE MNGMENT TRAINING: CPT

## 2020-10-21 PROCEDURE — 80069 RENAL FUNCTION PANEL: CPT

## 2020-10-21 PROCEDURE — 2580000003 HC RX 258: Performed by: INTERNAL MEDICINE

## 2020-10-21 RX ORDER — LOSARTAN POTASSIUM 25 MG/1
12.5 TABLET ORAL DAILY
Status: DISCONTINUED | OUTPATIENT
Start: 2020-10-21 | End: 2020-10-23

## 2020-10-21 RX ORDER — FUROSEMIDE 40 MG/1
40 TABLET ORAL DAILY
Qty: 60 TABLET | Refills: 3 | Status: SHIPPED | OUTPATIENT
Start: 2020-10-22 | End: 2020-10-23

## 2020-10-21 RX ORDER — LOSARTAN POTASSIUM 25 MG/1
12.5 TABLET ORAL DAILY
Qty: 30 TABLET | Refills: 3 | Status: SHIPPED | OUTPATIENT
Start: 2020-10-22 | End: 2020-10-23 | Stop reason: HOSPADM

## 2020-10-21 RX ADMIN — ENOXAPARIN SODIUM 30 MG: 30 INJECTION SUBCUTANEOUS at 09:48

## 2020-10-21 RX ADMIN — FUROSEMIDE 40 MG: 40 TABLET ORAL at 09:48

## 2020-10-21 RX ADMIN — LOSARTAN POTASSIUM 12.5 MG: 25 TABLET, FILM COATED ORAL at 09:48

## 2020-10-21 RX ADMIN — Medication 10 ML: at 22:19

## 2020-10-21 RX ADMIN — Medication 10 ML: at 09:49

## 2020-10-21 RX ADMIN — ATORVASTATIN CALCIUM 20 MG: 20 TABLET, FILM COATED ORAL at 22:19

## 2020-10-21 RX ADMIN — MICONAZOLE NITRATE: 20 POWDER TOPICAL at 09:52

## 2020-10-21 ASSESSMENT — PAIN SCALES - GENERAL
PAINLEVEL_OUTOF10: 0

## 2020-10-21 NOTE — CARE COORDINATION
CHRIS spoke w/Dr. Rivka Figueredo. He states to notify him if pt wants SNF or home w/home care. CM contacted daughter Ermelinda Briceño, [phone]. She is unhappy that pt has not been out of bed more and is requesting PT/OT to work with pt again. She states she is working on private duty care and does want home care as well. CM notified Rivka Figueredo regarding home with home care and CM notified PT/OT to work with pt again. CM will continue to follow. 2:01 PM  Chris met with daughter Ermelinda Briceño on unit. She was given IMM letter. She did not sign and is appealing the discharge. She states she needs more time to set up appropriate care for her mother.     Per daughter appeal # is [id number]

## 2020-10-21 NOTE — PROGRESS NOTES
CHF RN attempted to see patient at bedside. Pt and daughter meeting with case management and the PT/OT to see pt. CHF RN will attempt to see patient at later time.

## 2020-10-21 NOTE — PROGRESS NOTES
Physical Therapy  Facility/Department: [address] [facility] [address]  Daily Treatment Note  NAME: Catrachito Salvador  : 1933  MRN: [id number]    Date of Service: 10/21/2020    Discharge Recommendations:Nancy Willett scored a 13/24 on the AM-PAC short mobility form. Current research shows that an AM-PAC score of 17 or less is typically not associated with a discharge to the patient's home setting. Based on the patient's AM-PAC score and their current functional mobility deficits, it is recommended that the patient have 3-5 sessions per week of Physical Therapy at d/c to increase the patient's independence. Please see assessment section for further patient specific details. If patient discharges prior to next session this note will serve as a discharge summary. Please see below for the latest assessment towards goals. HOME HEALTH CARE: LEVEL 3 SAFETY (if home)     - Initial home health evaluation to occur within 24-48 hours, in patient home   - Therapy evaluations in home within 24-48 hours of discharge; including DME and home safety   - Frontload therapy 5 days, then 3x a week   - Therapy to evaluate if patient has [address] needs for personal care   -  evaluation within 24-48 hours, includes evaluation of resources and insurance to determine [place], [place], LTC, and Medicaid options       PT Equipment Recommendations  Equipment Needed: No    Assessment   Assessment: Pt demo slow improvement with mobility but still well below her reported baseline of independent ambulator at baseline. Pt req constant cueing for safety/sequence and max LOB posterior with transfers. Dtr here and plans to take pt home at discharge. Recommended to dtr to have assist x2 for showers/toileting. Dtr reports she will not have extra help except for home care. If home, recommend 24 hour assist, home PT, use of rolling walker (has). Pt limited by cognitive status/memory deficits.   Treatment Diagnosis: impaired functional mobility with gait and transfers  Patient Education: Pt educated on PT role, importance of OOB mobility,need to call for assist to get up and she verbalized understanding but will need reinforcement. REQUIRES PT FOLLOW UP: Yes  Activity Tolerance  Activity Tolerance: Patient limited by cognitive status; Patient limited by fatigue     Patient Diagnosis(es): The encounter diagnosis was Fall, initial encounter. has no past medical history on file. has no past surgical history on file. Restrictions  Position Activity Restriction  Other position/activity restrictions: up as tolerated     Subjective   General  Chart Reviewed: Yes  Additional Pertinent Hx: Pt is a 79 y/o female who presented to [facility] on 10/17 with AMS after a fall at home. 10/17 XR bilateral Hip/pelvis (-) acute fx, CT head (-) acute bleed, CT chest/abd/pelvis (+) hiatal hernia, inguinal hernia, mild right lower lobe atelectasis, CT spine (-) acute fx. PMHx: hypothyroidism, chronic kidney disease, hypertension  Family / Caregiver Present: Yes(dtr)  Subjective  Subjective: Pt found supine in bed and agreeable to PT. Pain Screening  Patient Currently in Pain: Denies         Orientation  Orientation  Overall Orientation Status: Impaired  Orientation Level: Oriented to person;Disoriented to time;Oriented to place        Objective   Bed mobility  Rolling to Right: Maximum assistance     Transfers  Sit to Stand: Moderate Assistance(with max vc for hand placement/safety; x4 trials (max assist x2 from toilet))  Stand to sit: Minimal Assistance(with max vc for hand placement; x4 trials)     Ambulation  Device: Rolling Walker  Assistance:  Moderate assistance(min assist for trials 2 and 3)  Quality of Gait: forward posture with  slow aranza, narrow JAYESH, decreased step length/height; tactile assist for RW management;pt fatigued quickly  Distance: 3 ft, 5 ft, 10 ft x2  Comments: Pt's ambulation distances greatly limited by cognitive

## 2020-10-21 NOTE — PROGRESS NOTES
Hospitalist Progress Note      PCP: No primary care provider on file. Date of Admission: 10/17/2020    Chief Complaint: [facility] Course: 80-year-old female with history of dementia presented to the hospital after a fall at home. Lives at home with daughter. As per admission note daughter stepped out for a couple of hours and when she came back she found her mother to be in supine position on the ground. Reports she slipped and fell. It was unwitnessed. It is reported that she has been frequently falling. According to the ER notes patient has ambulated with the walker via EMS and found to have a fever of 103 °F.  Afebrile while in ED. Information from Daughter Emmet Schilder -- Patient has been living with her daughter since April 2020  Has had multiple falls since then. Daughter tells me that she sits in a rocking chair and goes back and forth multiple times and sometimes she slips and skids onto the floor in the chair goes backwards. She does not feel that patient loses consciousness but not sure as mostly these falls are unwitnessed. There are reports of leg swelling with weeping for the last few months. Patient uses a walker at baseline but has been progressively getting weaker the last few weeks or months. She is able to dress herself in the morning but needs help with dressing in the evening, daughter does the cooking rest of the ADLs for the patient.     Subjective:   Patient seen and examined  Says she feels good, denies chest pain or shortness of breath    Medications:  Reviewed    Infusion Medications   Scheduled Medications    losartan  12.5 mg Oral Daily    furosemide  40 mg Oral Daily    sodium chloride flush  10 mL Intravenous 2 times per day    enoxaparin  30 mg Subcutaneous Daily    atorvastatin  20 mg Oral Nightly    miconazole   Topical BID     PRN Meds: perflutren lipid microspheres, labetalol, sodium chloride flush, acetaminophen **OR** acetaminophen, polyethylene glycol, promethazine **OR** ondansetron      Intake/Output Summary (Last 24 hours) at 10/21/2020 1558  Last data filed at 10/21/2020 1315  Gross per 24 hour   Intake 120 ml   Output 1375 ml   Net -1255 ml       Physical Exam Performed:    BP (!) 149/81   Pulse 78   Temp 97.2 °F (36.2 °C) (Oral)   Resp 18   Ht 5' 8\" (1.727 m)   Wt 194 lb 0.1 oz (88 kg)   SpO2 95%   BMI 29.50 kg/m²     General appearance: No apparent distress, appears stated age and cooperative. HEENT: Pupils equal, round, and reactive to light. Conjunctivae/corneas clear. Neck: Supple, with full range of motion. No jugular venous distention. Trachea midline. Respiratory:  Normal respiratory effort. Crackles at the bases  Cardiovascular: Regular rate and rhythm with normal S1/S2 without murmurs, rubs or gallops. Abdomen: Soft, non-tender, non-distended with normal bowel sounds. Musculoskeletal: Extensive bruising noted over the buttock area and perineum  Skin: Lower extremity venous stasis changes, trace edema  Neurologic: Generalized weakness, nonfocal exam  Psychiatric: Alert oriented to self and place  Capillary Refill: Brisk,< 3 seconds   Peripheral Pulses: +2 palpable, equal bilaterally   No change of exam since 10/20    Labs:   Recent Labs     10/19/20  1223   WBC 3.4*   HGB 12.1   HCT 36.6        Recent Labs     10/19/20  1223 10/20/20  1225 10/21/20  0604   * 134* 135*   K 3.1* 3.7 3.6   CL 94* 95* 97*   CO2 28 29 28   BUN 16 15 13   CREATININE 1.2 1.0 1.0   CALCIUM 8.5 8.7 8.5   PHOS 2.5 2.8 2.8     No results for input(s): AST, ALT, BILIDIR, BILITOT, ALKPHOS in the last 72 hours. No results for input(s): INR in the last 72 hours.   Recent Labs     10/19/20  1223   TROPONINI 0.04*       Urinalysis:      Lab Results   Component Value Date    NITRU Negative 10/19/2020    WBCUA 0-2 10/19/2020    BACTERIA Rare 10/19/2020    RBCUA 0-2 10/19/2020    BLOODU Negative 10/19/2020    SPECGRAV 1.015 10/19/2020    GLUCOSEU Negative 10/19/2020       Radiology:  CT CERVICAL SPINE WO CONTRAST   Final Result   1. No acute findings. 2. Degenerative changes contributing to central canal foraminal narrowing as detailed above. FL MODIFIED BARIUM SWALLOW W VIDEO   Final Result      As above. CT CHEST ABDOMEN PELVIS W CONTRAST   Final Result      CHEST:      1.  Very large hiatal hernia containing nondilated transverse colon and the entire stomach in the right lower chest.   2.  Mild to moderate right lower lobe atelectasis. No evidence of pneumonia. ABDOMEN/PELVIS:      1.  Large right inguinal hernia containing nondilated distal ileum and proximal colon. No evidence of bowel obstruction. 2.  Cholelithiasis. 3.  Right renal cortical atrophy. 4.  Urinary bladder diverticulum. 5.  Colonic diverticulosis. 6.  Chronic L5 compression fracture with retropulsion resulting in severe L4-5 spinal canal stenosis, similar compared to report from outside hospital MR lumbar spine dated 8/16/2019   7. Severe left hip osteoarthritis. No acute fracture. CT Head WO Contrast   Final Result      1. No acute intracranial process. 2.  Moderate cerebral atrophy and mild chronic small vessel ischemic disease. XR CHEST PORTABLE   Final Result      Right pleural effusion and right lower lobe atelectasis as well as possible left upper lobe collapse. XR HIP BILATERAL W AP PELVIS (2 VIEWS)   Final Result   Impression:    No acute osseous injury. Severe left hip osteoarthritis. Assessment/Plan:    Active Hospital Problems    Diagnosis    Hypertension [I10]    Acute diastolic (congestive) heart failure (HCC) [I50.31]    Severe mitral regurgitation [I34.0]    Altered mental state [R41.82]     1. Falls at home, reported as mechanical fall, unwitnessed and a possible syncope  2. Acute diastolic heart failure, new diagnosis  3. Episode of tachycardia, SVT HR 150s, 7 secs  4.  Generalized weakness/debility  5. Hyponatremia -- due to chf, hypokalemia  6. Electrolyte abnormalities  7. Dementia  8. Elevated troponin due to demand ischemia, trend flat  9. Dyslipidemia    Plan:  Echo without wall motion abnormalities, with G2DD  Continue Lasix 40 mg daily  Mild afterload reduction with losartan 12.5 mg p.o. daily. Cardiology on board, discussed with Dr. Otis Diez  Keep K > 4.0, Mag > 2.0  Continue Lipitor  Repeat PT OT evaluation today    DVT Prophylaxis: Lovenox  Diet: DIET LOW SODIUM 2 GM; 1500 ml  Dietary Nutrition Supplements: Standard High Calorie Oral Supplement  Code Status: Full Code    PT/OT Eval Status:  13/24 on the AM-PAC short mobility form.   Recommended HOME HEALTH CARE: LEVEL 3 SAFETY (if home)    Erin Bergeron to discharge    Surya Moreno MD   Hospitalist

## 2020-10-21 NOTE — PROGRESS NOTES
Patient alert and times three and sometimes four. Patient easily reoriented. Patient vial signs stable. Patient has purewicc draining clear yellow urine. Patient bedside table, call light, and belongings in reach. Patient bed locked in lowest position with bed alarm on. Patient did become a little confused and tugged ekg leads off. This nurse reapplied and explained we need those leads for watching the patients heart. Patient verbalized understanding. Patient currently resting at this time. Will continue to monitor.

## 2020-10-21 NOTE — PLAN OF CARE
Problem: Falls - Risk of:  Goal: Will remain free from falls  Description: Will remain free from falls  Outcome: Ongoing     Problem: Falls - Risk of:  Goal: Absence of physical injury  Description: Absence of physical injury  Outcome: Ongoing     Problem: Skin Integrity:  Goal: Will show no infection signs and symptoms  Description: Will show no infection signs and symptoms  Outcome: Ongoing     Problem: Skin Integrity:  Goal: Absence of new skin breakdown  Description: Absence of new skin breakdown  Outcome: Ongoing     Problem: Confusion - Acute:  Goal: Absence of continued neurological deterioration signs and symptoms  Description: Absence of continued neurological deterioration signs and symptoms  Outcome: Ongoing     Problem: Confusion - Acute:  Goal: Mental status will be restored to baseline  Description: Mental status will be restored to baseline  Outcome: Ongoing     Problem: Discharge Planning:  Goal: Ability to perform activities of daily living will improve  Description: Ability to perform activities of daily living will improve  Outcome: Ongoing     Problem: Discharge Planning:  Goal: Participates in care planning  Description: Participates in care planning  Outcome: Ongoing     Problem: Injury - Risk of, Physical Injury:  Goal: Will remain free from falls  Description: Will remain free from falls  Outcome: Ongoing     Problem: Injury - Risk of, Physical Injury:  Goal: Absence of physical injury  Description: Absence of physical injury  Outcome: Ongoing     Problem: Mood - Altered:  Goal: Mood stable  Description: Mood stable  Outcome: Ongoing     Problem: Mood - Altered:  Goal: Absence of abusive behavior  Description: Absence of abusive behavior  Outcome: Ongoing     Problem: Mood - Altered:  Goal: Verbalizations of feeling emotionally comfortable while being cared for will increase  Description: Verbalizations of feeling emotionally comfortable while being cared for will increase  Outcome: Ongoing     Problem: Psychomotor Activity - Altered:  Goal: Absence of psychomotor disturbance signs and symptoms  Description: Absence of psychomotor disturbance signs and symptoms  Outcome: Ongoing     Problem: Sensory Perception - Impaired:  Goal: Demonstrations of improved sensory functioning will increase  Description: Demonstrations of improved sensory functioning will increase  Outcome: Ongoing     Problem: Sensory Perception - Impaired:  Goal: Able to refrain from responding to false sensory perceptions  Description: Able to refrain from responding to false sensory perceptions  Outcome: Ongoing     Problem: Sensory Perception - Impaired:  Goal: Demonstrates accurate environmental perceptions  Description: Demonstrates accurate environmental perceptions  Outcome: Ongoing     Problem: Sensory Perception - Impaired:  Goal: Able to distinguish between reality-based and nonreality-based thinking  Description: Able to distinguish between reality-based and nonreality-based thinking  Outcome: Ongoing     Problem: Sensory Perception - Impaired:  Goal: Able to interrupt nonreality-based thinking  Description: Able to interrupt nonreality-based thinking  Outcome: Ongoing     Problem: Sleep Pattern Disturbance:  Goal: Appears well-rested  Description: Appears well-rested  Outcome: Ongoing     Problem: Nutrition  Goal: Optimal nutrition therapy  Outcome: Ongoing

## 2020-10-21 NOTE — CONSULTS
Cardiology Consultation                                                                    Pt Name: Phuc Thapa  Age: 80 y.o. Sex: female  : 1933  Location: 6310/6310-01    Referring Physician: Kori Sharp MD  Primary cardiologist: Dr Dre Vega      Reason for Consult:     Reason for Consultation/Chief Complaint: HFpEF, severe MR    HPI:      Phuc Thapa is a 80 y.o. female with a past medical history of dementia, frequent falls, hypertension (CODE status FULL). Was admitted on 10/17 after she was found down on the floor. Patient states that she slipped and fell. EMS found her to have a fever of 103 finite; she was afebrile in the emergency room and during this admission. She had an echocardiogram which was abnormal therefore cardiology was consulted. ECG x2 normal.  Peak troponin 0.07. Echo 10/17/2020: Normal except for diastolic dysfunction stage II, moderate to severe eccentric mitral regurgitation with severe MAC. Patient reports no prior cardiac history. She denies any chest pains, shortness of breath, palpitations. She is a poor historian due to dementia. Patient has been mildly hypertensive since admission. Histories     Past Medical History:   has no past medical history on file. Surgical History:   has no past surgical history on file. Social History:        Family History:  No evidence for sudden cardiac death or premature CAD      Medications:       Home Medications  Were reviewed and are listed in nursing record. and/or listed below  Prior to Admission medications    Medication Sig Start Date End Date Taking?  Authorizing Provider   furosemide (LASIX) 40 MG tablet Take 1 tablet by mouth daily 10/22/20  Yes Kori Sharp MD   losartan (COZAAR) 25 MG tablet Take 0.5 tablets by mouth daily 10/22/20  Yes Kori Sharp MD   levothyroxine (SYNTHROID) 75 MCG tablet Take 75 mcg by mouth Daily   Yes Historical Provider, MD   pravastatin (PRAVACHOL) 20 MG tablet Take 40 mg 10/19/20 194 lb 0.1 oz (88 kg)         General Appearance:  Alert, cooperative, no distress, appears stated age Appropriate weight   Head:  Normocephalic, without obvious abnormality, atraumatic   Eyes:  PERRL, conjunctiva/corneas clear EOM intact  Ears normal   Throat no lesions       Nose: Nares normal, no drainage or sinus tenderness   Throat: Lips, mucosa, and tongue normal   Neck: Supple, symmetrical, trachea midline, no adenopathy, thyroid: not enlarged, symmetric, no tenderness/mass/nodules, no carotid bruit. Lungs:   Respirations unlabored, clear to auscultation bilaterally, without any wheezes, rubs or ronchi. Chest Wall:  No tenderness or deformity   Heart:  Regular rhythm, rate is controlled, S1, S2 normal, there is no murmur, there is no rub or gallop, no jvd, no bilateral lower extremity edema   Abdomen:   Soft, non-tender, bowel sounds active all four quadrants,  no masses, no organomegaly       Extremities: Extremities normal, atraumatic, no cyanosis. Pulses: 2+ and symmetric   Skin: Skin color, texture, turgor normal, no rashes or lesions   Pysch: Normal mood and affect   Neurologic: Normal gross motor and sensory exam.  Cranial nerves intact        Labs:     Recent Labs     10/19/20  1223 10/20/20  1225 10/20/20  1500 10/21/20  0604   * 134*  --  135*   K 3.1* 3.7  --  3.6   BUN 16 15  --  13   CREATININE 1.2 1.0  --  1.0   CL 94* 95*  --  97*   CO2 28 29  --  28   GLUCOSE 157* 108*  --  89   CALCIUM 8.5 8.7  --  8.5   MG 1.90  --  2.10  --      Recent Labs     10/19/20  1223   WBC 3.4*   HGB 12.1   HCT 36.6      MCV 92.1     No results for input(s): CHOLTOT, TRIG, HDL in the last 72 hours. Invalid input(s): LIPIDCOMM, CHOLHDL, VLDCHOL, LDL  No results for input(s): PTT, INR in the last 72 hours. Invalid input(s): PT  Recent Labs     10/19/20  1223   TROPONINI 0.04*     No results for input(s): BNP in the last 72 hours.   No results for input(s): TSH in the last 72

## 2020-10-21 NOTE — PROGRESS NOTES
Occupational Therapy   Occupational Therapy Initial Assessment and Treatment    Date: 10/21/2020   Patient Name: Kyleigh Rodriguez  MRN: [id number]     : 1933    Date of Service: 10/21/2020    Discharge Recommendations:  Kyleigh Rodriguez scored a 12/24 on the AM-PAC ADL Inpatient form. Current research shows that an AM-PAC score of 17 or less is typically not associated with a discharge to the patient's home setting. Based on the patient's AM-PAC score and their current ADL deficits, it is recommended that the patient have 3-5 sessions per week of Occupational Therapy at d/c to increase the patient's independence. Please see assessment section for further patient specific details. If home,   HOME HEALTH CARE: LEVEL 3 SAFETY     - Initial home health evaluation to occur within 24-48 hours, in patient home   - Therapy evaluations in home within 24-48 hours of discharge; including DME and home safety   - Frontload therapy 5 days, then 3x a week   - Therapy to evaluate if patient has [address] needs for personal care   -  evaluation within 24-48 hours, includes evaluation of resources and insurance to determine AL, [place], LTC, and Medicaid options       If patient discharges prior to next session this note will serve as a discharge summary. Please see below for the latest assessment towards goals. OT Equipment Recommendations  Equipment Needed: No(has needed equipment in place)    Assessment   Performance deficits / Impairments: Decreased functional mobility ; Decreased ADL status; Decreased endurance;Decreased balance;Decreased cognition;Decreased safe awareness  Assessment: Pt admitted from home w/ c/o AMS s/p fall. At baseline, pt has been safe to be home when dtr at work - ambulating in home using wh walker; able to change depends, toilet, and dress self. At present time, pt requiring Max A for bed mobility, dependent assist for toileting hygiene, and dependent assist for LB dressing.  Pt will enter with rails  Entrance Stairs - Number of Steps: 4-5 steps  Entrance Stairs - Rails: Left  Bathroom Shower/Tub: Walk-in shower(walk in shower with small threshold)  Bathroom Toilet: Handicap height(w/ 3in1 over toilet)  Bathroom Equipment: Hand-held shower  Bathroom Accessibility: (Domenico Savanah does not fit in bathroom)  Home Equipment: Rolling walker, Cane, [employer], Reacher, Sock aid, Long-handled shoehorn  Receives Help From: Family  ADL Assistance: Needs assistance(able to toilet self and change depends at baseline; receives assist w/ showers and assist to don socks/shoes)  Homemaking Assistance: Needs assistance(family)  Ambulation Assistance: Independent(with RW)  Transfer Assistance: Needs assistance(getting pt out of chair at times)  Active : No(daughter drives)  Occupation: Retired  Additional Comments: 3 falls over the last 8 months, daughter works 36 hrs/wk but is available to assist pt in the evenings, pt lives in daughter's house; daughter reports she is 1 of 5 children, but states she has been primary caregiver (given COVID pandemic)       Objective   Vision: Impaired  Vision Exceptions: Wears glasses for reading  Hearing: Exceptions to [facility]  Hearing Exceptions: Hard of hearing/hearing concerns      Orientation  Overall Orientation Status: Impaired  Orientation Level: Oriented to person;Disoriented to time;Disoriented to situation;Oriented to place        Balance  Sitting Balance: Stand by assistance    Functional Mobility  Functional - Mobility Device: Rolling Walker  Activity: Other; To/from bathroom(~10 feet (x2 times))  Assist Level: (Min/Mod A)  Functional Mobility Comments: Note: cues for upright standing posture, cues for safe walker management and negotiation    Toilet Transfers  Toilet - Technique: Ambulating(using wh walker)  Equipment Used: Standard toilet(w/ bar)  Toilet Transfer: Dependent/Total(Max A of 2)  Toilet Transfers Comments: Note: per discussion w/ dtr, they have a 3in1 over toilet at home (3in1 placed over toilet in bathroom for increased independence); chair set up in room to be closer to bathroom to promote mobility to/from toilet w/ assistance    ADL  LE Dressing: Dependent/Total(assist to thread both feet into depends, assist to pull pants up)  Toileting: Dependent/Total((+) loose BM and passing gas on toilet; using Purewick; assist to pull pants up/down and assist to wipe)     Tone RUE  RUE Tone: Normotonic  Tone LUE  LUE Tone: Normotonic  Coordination  Movements Are Fluid And Coordinated: Yes        Bed mobility  Supine to Sit: Maximum assistance(Simultaneous filing. User may not have seen previous data.)  Scooting: Maximal assistance(to EOB Simultaneous filing. User may not have seen previous data.)  Comment: increased time/ effort to initiate bed mobility; UE guidance and physical prompting w/ use of bedrail. Limited command following. Transfers  Sit to stand: (Max A + Min A (from elevated bed to walker); Mod A (from chair x 4 times); Max A of 2 (from toilet w/ bar))  Stand to sit: Moderate assistance(to control each descent, cues for hand placement and technique)  Transfer Comments: Note: max cues for safe hand placement during each transfer, cues to pull feet beneath body prior to standing, cues to rock to get momentum; limited command following of directions        Cognition  Overall Cognitive Status: Exceptions  Arousal/Alertness: Delayed responses to stimuli  Following Commands:  Follows one step commands with repetition  Attention Span: Difficulty attending to directions  Memory: Decreased recall of biographical Information;Decreased recall of precautions;Decreased recall of recent events;Decreased short term memory  Safety Judgement: Decreased awareness of need for assistance;Decreased awareness of need for safety  Problem Solving: Decreased awareness of errors  Insights: Decreased awareness of deficits  Initiation: Requires cues for all  Sequencing: Requires cues for all  Cognition Comment: flat affect; cues to keep on task                    LUE AROM (degrees)  LUE AROM : WFL  RUE AROM (degrees)  RUE AROM : WFL              Pt seen by OT for eval and treat.  Treatment included: bed mobility, functional transfers/mobility, ADL, pt/dtr education              Plan   Plan  Times per week: 2-5  Times per day: Daily  Current Treatment Recommendations: Balance Training, Functional Mobility Training, Safety Education & Training, Self-Care / ADL, Endurance Training, Patient/Caregiver Education & Training                                                    AM-PAC Score        AM-[facility] Inpatient Daily Activity Raw Score: 12 (10/21/20 1443)  AM-PAC Inpatient ADL T-Scale Score : 30.6 (10/21/20 1443)  ADL Inpatient CMS 0-100% Score: 66.57 (10/21/20 1443)  ADL Inpatient CMS G-Code Modifier : CL (10/21/20 1443)    Goals  Short term goals  Time Frame for Short term goals: Discharge  Short term goal 1: 3in1 transfer w/ Min A  Short term goal 2: chair pushups x 5 for increased functional strength for transfers  Short term goal 3: depends w/ Mod A  Patient Goals   Patient goals : no goal stated by pt; dtr (caregiver) goal is to take pt home       Therapy Time   Individual Concurrent Group Co-treatment   Time In 1325         Time Out 1418         Minutes 53           Timed Code Treatment Minutes:   38    Total Treatment Minutes:  [facility] OTR/L #[id number]

## 2020-10-21 NOTE — DISCHARGE INSTR - COC
Continuity of Care Form    Patient Name: Shey Clifton   :  1933  MRN:  [id number]    Admit date:  10/17/2020  Discharge date:  ***    Code Status Order: Full Code   Advance Directives:   Advance Care Flowsheet Documentation       Date/Time Healthcare Directive Type of Healthcare Directive Copy in [address] Agent's Name Healthcare Agent's Phone Number    10/18/20 0020  No, patient does not have an advance directive for healthcare treatment -- -- -- -- --            Admitting Physician:  Kyree Garcia MD  PCP: No primary care provider on file. Discharging Nurse: [facility] Unit/Room#: 6022/3307-81  Discharging Unit Phone Number: ***    Emergency Contact:   Extended Emergency Contact Information  Primary Emergency Contact: unknown, unknown  Home Phone: [phone]  Relation: Unknown    Past Surgical History:  No past surgical history on file.     Immunization History:   Immunization History   Administered Date(s) Administered    Pneumococcal Conjugate 13-valent ([id number]) 2015    Pneumococcal Polysaccharide (Duwsqucoz95) 2001, 2018    Tdap (Boostrix, Adacel) 2015       Active Problems:  Patient Active Problem List   Diagnosis Code    Altered mental state R41.82    Hypertension I10    Acute diastolic (congestive) heart failure (HCC) I50.31    Severe mitral regurgitation I34.0       Isolation/Infection:   Isolation            No Isolation          Patient Infection Status       None to display            Nurse Assessment:  Last Vital Signs: BP (!) 159/85   Pulse 66   Temp 96.8 °F (36 °C) (Oral)   Resp 18   Ht 5' 8\" (1.727 m)   Wt 194 lb 0.1 oz (88 kg)   SpO2 95%   BMI 29.50 kg/m²     Last documented pain score (0-10 scale): Pain Level: 0  Last Weight:   Wt Readings from Last 1 Encounters:   10/19/20 194 lb 0.1 oz (88 kg)     Mental Status:  {IP PT MENTAL STATUS:}    IV Access:  [facility] IV HQCHDM:[id number]}    Nursing Mobility/ADLs:  Walking   {CHP DME CJQL:500096906}  Transfer  {P DME PQAF:458858722}  Bathing  {CHP DME TUTS:910528594}  Dressing  {CHP DME BDAL:663116151}  Toileting  {CHP DME NPRM:856912893}  Feeding  {P DME VWOK:710950834}  Med Admin  {[facility] DME RSWZ:257307193}  Med Delivery   {[facility] MED Delivery:802779975}    Wound Care Documentation and Therapy:  Wound 10/17/20 Buttocks blanchable redness along with purple discoloration resembling bruise or possibly deep tissue injury (Active)   Dressing Status Clean;Dry; Intact 10/21/20 0501   Dressing/Treatment Open to air;Moisture barrier 10/21/20 0501   Wound Assessment Dry 10/21/20 0501   Number of days: 3       Wound 10/17/20 Groin Anterior;Right;Left excoriation and fungal rash to groin folds (Active)   Wound Cleansed Soap and water 10/19/20 0847   Dressing/Treatment Open to air 10/21/20 0501   Number of days: 3       Wound 10/17/20 Pretibial Right;Left redness, inflamed, cellulitis (Active)   Dressing/Treatment Open to air 10/21/20 0501   Drainage Amount None 10/19/20 0015   Number of days: 3       Wound 10/17/20 Pretibial Left; Inner small open area; cellulitis; ulceration (Active)   Dressing/Treatment Open to air 10/21/20 0501   Drainage Amount None 10/19/20 0015   Number of days: 3        Elimination:  Continence:   · Bowel: {YES / CC:21424}  · Bladder: {YES / XV:76081}  Urinary Catheter: {Urinary Catheter:355989019}   Colostomy/Ileostomy/Ileal Conduit: {YES / IH:36882}       Date of Last BM: ***    Intake/Output Summary (Last 24 hours) at 10/21/2020 1127  Last data filed at 10/21/2020 0810  Gross per 24 hour   Intake 240 ml   Output 775 ml   Net -535 ml     I/O last 3 completed shifts:   In: 480 [P.O.:480]  Out: 400 [Urine:400]    Safety Concerns:     [address] YENNIFER Safety Concerns:[id number]}    Impairments/Disabilities:      [address] DE OLIVEIRA Impairments/Disabilities:127011938}    Nutrition Therapy:  Current Nutrition Therapy:   [address] DE OLIVEIRA Diet H&P    PHYSICIAN SIGNATURE:  Electronically signed by Kalpana Lira MD on 10/22/20 at 8:57 AM EDT

## 2020-10-22 LAB
ANION GAP SERPL CALCULATED.3IONS-SCNC: 11 MMOL/L (ref 3–16)
BUN BLDV-MCNC: 12 MG/DL (ref 7–20)
CALCIUM SERPL-MCNC: 8.8 MG/DL (ref 8.3–10.6)
CHLORIDE BLD-SCNC: 95 MMOL/L (ref 99–110)
CO2: 28 MMOL/L (ref 21–32)
CREAT SERPL-MCNC: 1 MG/DL (ref 0.6–1.2)
GFR AFRICAN AMERICAN: >60
GFR NON-AFRICAN AMERICAN: 52
GLUCOSE BLD-MCNC: 96 MG/DL (ref 70–99)
MAGNESIUM: 1.7 MG/DL (ref 1.8–2.4)
POTASSIUM SERPL-SCNC: 3.5 MMOL/L (ref 3.5–5.1)
SODIUM BLD-SCNC: 134 MMOL/L (ref 136–145)

## 2020-10-22 PROCEDURE — 1200000000 HC SEMI PRIVATE

## 2020-10-22 PROCEDURE — 6370000000 HC RX 637 (ALT 250 FOR IP): Performed by: INTERNAL MEDICINE

## 2020-10-22 PROCEDURE — 2580000003 HC RX 258: Performed by: INTERNAL MEDICINE

## 2020-10-22 PROCEDURE — 80048 BASIC METABOLIC PNL TOTAL CA: CPT

## 2020-10-22 PROCEDURE — 36415 COLL VENOUS BLD VENIPUNCTURE: CPT

## 2020-10-22 PROCEDURE — 97530 THERAPEUTIC ACTIVITIES: CPT

## 2020-10-22 PROCEDURE — 83735 ASSAY OF MAGNESIUM: CPT

## 2020-10-22 PROCEDURE — 99233 SBSQ HOSP IP/OBS HIGH 50: CPT | Performed by: INTERNAL MEDICINE

## 2020-10-22 PROCEDURE — 6360000002 HC RX W HCPCS: Performed by: INTERNAL MEDICINE

## 2020-10-22 RX ORDER — MAGNESIUM SULFATE IN WATER 40 MG/ML
2 INJECTION, SOLUTION INTRAVENOUS ONCE
Status: COMPLETED | OUTPATIENT
Start: 2020-10-22 | End: 2020-10-22

## 2020-10-22 RX ADMIN — FUROSEMIDE 40 MG: 40 TABLET ORAL at 09:01

## 2020-10-22 RX ADMIN — ATORVASTATIN CALCIUM 20 MG: 20 TABLET, FILM COATED ORAL at 21:57

## 2020-10-22 RX ADMIN — MICONAZOLE NITRATE: 20 POWDER TOPICAL at 05:30

## 2020-10-22 RX ADMIN — MICONAZOLE NITRATE: 20 POWDER TOPICAL at 08:57

## 2020-10-22 RX ADMIN — MAGNESIUM SULFATE HEPTAHYDRATE 2 G: 40 INJECTION, SOLUTION INTRAVENOUS at 11:07

## 2020-10-22 RX ADMIN — Medication 10 ML: at 21:57

## 2020-10-22 RX ADMIN — Medication 10 ML: at 08:57

## 2020-10-22 RX ADMIN — LOSARTAN POTASSIUM 12.5 MG: 25 TABLET, FILM COATED ORAL at 09:01

## 2020-10-22 RX ADMIN — ENOXAPARIN SODIUM 30 MG: 30 INJECTION SUBCUTANEOUS at 09:01

## 2020-10-22 RX ADMIN — MICONAZOLE NITRATE: 20 POWDER TOPICAL at 21:58

## 2020-10-22 ASSESSMENT — PAIN SCALES - GENERAL
PAINLEVEL_OUTOF10: 0

## 2020-10-22 NOTE — PROGRESS NOTES
Physical Therapy  Facility/Department: [address]  Daily Treatment Note  NAME: Shahzad Monk  : 1933  MRN: [id number]    Date of Service: 10/22/2020    Discharge Recommendations:Nancy Garcia scored a 13/24 on the AM-PAC short mobility form. Current research shows that an AM-PAC score of 17 or less is typically not associated with a discharge to the patient's home setting. Based on the patient's AM-PAC score and their current functional mobility deficits, it is recommended that the patient have 3-5 sessions per week of Physical Therapy at d/c to increase the patient's independence. Please see assessment section for further patient specific details. If patient discharges prior to next session this note will serve as a discharge summary. Please see below for the latest assessment towards goals. PT Equipment Recommendations  Equipment Needed: No  Other: defer to next level of care    Assessment   Body structures, Functions, Activity limitations: Decreased functional mobility ; Decreased strength;Decreased cognition;Decreased balance  Assessment: Pt with slow progress in ability to stand for 3.5 and 2.5 min in bird [employer] today with trial of lifting feet and then hands. She did have a lot of difficulty trying to raise her feet up from stedy and could only manage lifting the L one. She requires consistent cueing with activity and recommend 2 assist with mobility. Dtr to take home per previous notes though will not have 2 assist.  If home recommend 24 hr sup/assist, use of walker. Pt limited by cognition and weakness. Treatment Diagnosis: impaired functional mobility with gait and transfers  Prognosis: Fair  PT Education: PT Role;Plan of Care; Functional Mobility Training;Transfer Training  Patient Education: Pt educated on PT role, importance of OOB mobility,need to call for assist to get up and she verbalized understanding but will need reinforcement.   REQUIRES PT FOLLOW UP: Yes  Activity Tolerance  Activity Tolerance: Patient limited by cognitive status; Patient limited by fatigue     Patient Diagnosis(es): The encounter diagnosis was Fall, initial encounter. has no past medical history on file. has no past surgical history on file. Restrictions  Position Activity Restriction  Other position/activity restrictions: up as tolerated  Subjective   General  Chart Reviewed: Yes  Additional Pertinent Hx: Pt is a 81 y/o female who [facility] A[facility] after a fall at home. 10/17 XR bilateral Hip/pelvis (-) acute fx, CT head (-) acute bleed, CT chest/abd/pelvis (+) hiatal hernia, inguinal hernia, mild right lower lobe atelectasis, CT spine (-) acute fx. PMHx: hypothyroidism, chronic kidney disease, hypertension  Family / Caregiver Present: No  Referring Practitioner: Guillermo Machado MD  Subjective  Subjective: Pt found supine in bed and agreeable to PT. Pain Screening  Patient Currently in Pain: Denies(does note pain with mobility when therpaist assisting her at her L LE)  Vital Signs  Patient Currently in Pain: Denies(does note pain with mobility when therpaist assisting her at her L LE)       Orientation     Cognition   Cognition  Overall Cognitive Status: Exceptions  Arousal/Alertness: Delayed responses to stimuli  Following Commands:  Follows one step commands with repetition  Attention Span: Difficulty attending to directions  Memory: Decreased recall of biographical Information;Decreased recall of precautions;Decreased recall of recent events;Decreased short term memory  Safety Judgement: Decreased awareness of need for assistance;Decreased awareness of need for safety  Problem Solving: Decreased awareness of errors  Insights: Decreased awareness of deficits  Initiation: Requires cues for all  Sequencing: Requires cues for all  Cognition Comment: flat affect; cues to keep on task  Objective   Bed mobility  Supine to Sit: Maximum assistance  Sit to Supine: Maximum assistance  Scooting: Maximal assistance(to EOB, dependent of 2 to boost her in bed)  Comment: increased time to complete bed mobility, required max cueing for sequencing with HOB elevated. Difficulty following commands  Transfers  Sit to Stand: Moderate Assistance(to bird stedy)  Stand to sit: Minimal Assistance(for eccentric control)  Comment: Pt able to  bird stedy x 3.5 min with therapist instructing her in pregait training of lifting her feet bilaterally. Pt with difficulty performing and with following those commands. Unable to lift R LE but able to lift L LE once. She then stood a second time on stedy for 2.5 min with therapist instructing her to alternate lifting a hand off of it to practice her balance. Pt CGA for balance with these activities and stedy. Pt at times with limited command following.   Ambulation  Ambulation?: No     Balance  Sitting - Static: Fair;-  Sitting - Dynamic: Poor  Standing - Static: Poor  Standing - Dynamic: Poor  Exercises  Comments: supine heel slides, SAQ x 10 dio with difficulty staying on task, requiring frequent cues        Goals  Short term goals  Time Frame for Short term goals: discharge  Short term goal 1: supine <> sit CGA  ongoing  Short term goal 2: sit <> stand Min A RW   ongoing  Short term goal 3: Pt will ambulate 20ft with RW SBA   ongoing  Patient Goals   Patient goals : to return to daughter's home    Plan    Plan  Times per week: 2-5  Times per day: Daily  Current Treatment Recommendations: Strengthening, Balance Training, Endurance Training, Gait Training, Stair training, Functional Mobility Training, Transfer Training, Home Exercise Program  Safety Devices  Type of devices: Call light within reach, Gait belt, Nurse notified, Bed alarm in place, Left in bed     Therapy Time   Individual Concurrent Group Co-treatment   Time In 1510         Time Out 1535         Minutes 25         Timed Code Treatment Minutes: 25 Minutes    Timed Code Treatment Minutes: 25 Minutes    Total Treatment Minutes:  25 minutes      Melburn Riedel, PT

## 2020-10-22 NOTE — PROGRESS NOTES
Cardiology Consult Service  Daily Progress Note        Admit Date:  10/17/2020  Primary cardiologist: Dr Shahid Salmeron    Reason for Consultation/Chief Complaint: HFpEF, severe MR    Subjective:      Daya Daniels is a 80 y.o. female with a past medical history of dementia, frequent falls, hypertension (CODE status FULL).    Was admitted on 10/17 after she was found down on the floor. Patient states that she slipped and fell. EMS found her to have a fever of 103 finite; she was afebrile in the emergency room and during this admission. She had an echocardiogram which was abnormal therefore cardiology was consulted. ECG x2 normal.  Peak troponin 0.07.     Echo 10/17/2020: Normal except for diastolic dysfunction stage II, moderate to severe eccentric mitral regurgitation with severe MAC.     Patient reports no prior cardiac history. She denies any chest pains, shortness of breath, palpitations. She is a poor historian due to dementia. Patient has been mildly hypertensive since admission. Interval history: There were no overnight events. Patient was mildly hypertensive. She was started on losartan 12.5 mg daily yesterday.   Creatinine stable at 1.0.`    Objective:     Medications:   losartan  12.5 mg Oral Daily    furosemide  40 mg Oral Daily    sodium chloride flush  10 mL Intravenous 2 times per day    enoxaparin  30 mg Subcutaneous Daily    atorvastatin  20 mg Oral Nightly    miconazole   Topical BID       IV drips:      PRN:  perflutren lipid microspheres, labetalol, sodium chloride flush, acetaminophen **OR** acetaminophen, polyethylene glycol, promethazine **OR** ondansetron    Vitals:    10/21/20 2341 10/22/20 0506 10/22/20 0852 10/22/20 1137   BP: (!) 175/77 (!) 177/86 (!) 149/79 139/78   Pulse: 75 81 75 79   Resp: 18 18 18 18   Temp: 97.6 °F (36.4 °C) 97 °F (36.1 °C) 97 °F (36.1 °C) 98.9 °F (37.2 °C)   TempSrc: Oral Oral Oral Axillary   SpO2: 95% 92% 95% 93%   Weight:  201 lb 4.5 oz (91.3 kg) Height:           Intake/Output Summary (Last 24 hours) at 10/22/2020 1617  Last data filed at 10/22/2020 1409  Gross per 24 hour   Intake 670 ml   Output 401 ml   Net 269 ml     I/O last 3 completed shifts: In: 670 [P.O.:660; I.V.:10]  Out: 401 [Urine:401]  Wt Readings from Last 3 Encounters:   10/22/20 201 lb 4.5 oz (91.3 kg)       Admit Wt: Weight: 194 lb 10.7 oz (88.3 kg)   Todays Wt: Weight: 201 lb 4.5 oz (91.3 kg)    TELEMETRY: Sinus     Physical Exam:         General Appearance:  Alert, cooperative, no distress, appears stated age Appropriate weight   Head:  Normocephalic, without obvious abnormality, atraumatic   Eyes:  PERRL, conjunctiva/corneas clear EOM intact  Ears normal   Throat no lesions       Nose: Nares normal, no drainage or sinus tenderness   Throat: Lips, mucosa, and tongue normal   Neck: Supple, symmetrical, trachea midline, no adenopathy, thyroid: not enlarged, symmetric, no tenderness/mass/nodules, no carotid bruit. Lungs:   Normal respiratory rate, lungs clear to auscultation without any wheezes, rubs or ronchi bilaterally. Chest Wall:  No tenderness or deformity   Heart:  Regular rhythm, rate is controlled, S1, S2 normal, there is no murmur, there is no rub or gallop, no jvd, no bilateral lower extremity edema   Abdomen:   Soft, non-tender, bowel sounds active all four quadrants,  no masses, no organomegaly       Extremities: Extremities normal, atraumatic, no cyanosis.     Pulses: 2+ and symmetric   Skin: Skin color, texture, turgor normal, no rashes or lesions   Pysch: Normal mood and affect   Neurologic: Normal gross motor and sensory exam.  Cranial nerves intact       Labs:   Recent Labs     10/20/20  1225 10/20/20  1500 10/21/20  0604 10/22/20  0637   *  --  135* 134*   K 3.7  --  3.6 3.5   BUN 15  --  13 12   CREATININE 1.0  --  1.0 1.0   CL 95*  --  97* 95*   CO2 29  --  28 28   GLUCOSE 108*  --  89 96   CALCIUM 8.7  --  8.5 8.8   MG  --  2.10  --  1.70*     No results for input(s): WBC, HGB, HCT, PLT, MCV in the last 72 hours. No results for input(s): CHOLTOT, TRIG, HDL in the last 72 hours. Invalid input(s): LIPIDCOMM, CHOLHDL, VLDCHOL, LDL  No results for input(s): PTT, INR in the last 72 hours. Invalid input(s): PT  No results for input(s): CKTOTAL, CKMB, CKMBINDEX, TROPONINI in the last 72 hours. No results for input(s): BNP in the last 72 hours. No results for input(s): NTPROBNP in the last 72 hours. No results for input(s): TSH in the last 72 hours. Imaging:       Assessment & Plan:     1. Chronic HFpEF:  Patient was euvolemic and hemodynamically stable on admission.     -Patient used to be on torsemide 60 mg daily; agree with Lasix 40 mg daily upon discharge     2. Severe MR: It is most likely due to severe mitral annular calcification. It is structural in etiology.      -Conservative approach  -Will attempt mild afterload reduction with losartan 12.5 mg p.o. daily.     3.  Essential hypertension:  Patient's BP was mildly elevated during this admission.     -We will treat with losartan per #2.     Patient may be discharged home today on the above meds and follow up with me in 1 week with a BMP prior to visit. Please call me with any questions. I have spent 35 minutes of face to face time with the patient with more than 50% spent counseling and coordinating care. I have personally reviewed the reports and images of labs, radiological studies, cardiac studies including ECG's and telemetry, current and old medical records. The note was completed using EMR and Dragon dictation system. Every effort was made to ensure accuracy; however, inadvertent computerized transcription errors may be present. All questions and concerns were addressed to the patient/family. Alternatives to my treatment were discussed. Thank you for allowing to us to participate in the care or Rashad Peter. Please call our service with questions.     Elke Ferguson MD, [place] [address]  Ph: [phone]  Fax: [phone]

## 2020-10-22 NOTE — PROGRESS NOTES
Physician Progress Note      PATIENT:               Kenny Burns  CSN #:                  [id number]  :                       1933  ADMIT DATE:       10/17/2020 4:53 PM  [address] [place] [place] DATE:  RESPONDING  PROVIDER #:        Afia Wills MD          QUERY TEXT:    Pt admitted with falls and hyponatremia. Noted documentation of \"Chronic   HFpEF: Patient was euvolemic and hemodynamically stable on admission\" by   Cardiology consultant. If possible, please document in progress notes and   discharge summary:    The medical record reflects the following:  Risk Factors: Severe MR, HTN  Clinical Indicators: Per Cardiology \"Chronic HFpEF: Patient was euvolemic and   hemodynamically stable on admission; Severe MR: It is most likely due to severe   mitral annular calcification\"; Pro-BNP trend: 8504- 2263; Per ED exam: Normal   breath sounds, Per H&P exam: Clear to auscultation; CT Chest on admit: right   lower lobe atelectasis, no pleural effusion  Treatment: 20 mg IVP Lasix x 1 dose on admit, then Lasix PO daily  Options provided:  -- Newly diagnosed HFpEF confirmed POA, acute HFpEF ruled out  -- Chronic HFpEF ruled out, Acute HFpEF confirmed  -- Defer to Cardiology consultant documentation regarding chronic HFpEF  -- Other - I will add my own diagnosis  -- Disagree - Not applicable / Not valid  -- Disagree - Clinically unable to determine / Unknown  -- Refer to Clinical Documentation Reviewer    PROVIDER RESPONSE TEXT:    I defer to cardiology consultant regarding documentation of chronic HFpEF. Query created by:  Audelia Fleischer on 10/22/2020 9:38 AM      Electronically signed by:  Afia Wills MD 10/22/2020 10:51 AM

## 2020-10-22 NOTE — PLAN OF CARE
Nutrition Problem #1: Predicted inadequate energy intake  Intervention: Food and/or Nutrient Delivery: Continue Current Diet, Modify Oral Nutrition Supplement  Nutritional Goals: Pt to tolerate the most appropriate form of nutrition therapy to meet nutritional needs

## 2020-10-22 NOTE — CARE COORDINATION
CM following for d/c planning and following up on Pt medicare  Appeal yest  To d/c >      Appeal Started:  10/21/2020 1:33:41 PM    Medical Records Received:  10/21/2020 5:16:23 PM    Outcome:  OR Agrees with termination of Hospital services    Liability:  Beneficiary Liability Starts on 10/23/2020     CM spoke with dgtr Flaco Vargas and her  sister from out out town who was visiting:  She is aware  Of  Decision and Was  Notified by [place] of the decision . And aware of  D/C  By  10/23    CM made requested  referral to [facility] ;    [address] [place] [address] [address] [address]       Phone: [phone]       Fax: [phone]         And accepted the patient for [facility] Fri/Sat. Flaco Vargas spoke w [facility] [address]  Non Skilled  And getting [facility]  Private  Duty arranged, she requested  Medical transport  With CM arranged  1230pm   10/23/2020 to her home address ,     [address] , [place] [address]. Fast track referral made  For  [facility]  To see what services  she may be eligible for . All questions and concerns addressed  Resources  provided  , ( cont to decline SNF )     Electronically signed by Kaur Kate RN on 10/22/2020 at 5:16 PM         Kaur Kate RN Case Manager  [facility]  [address] [place] [address] [address].   [facility] [address]  [phone]  Fax [phone]

## 2020-10-22 NOTE — PROGRESS NOTES
CLINICAL PHARMACY NOTE: MEDS TO [address] Select Patient?: No  Total # of Prescriptions Filled: 0   The following medications were delivered to the patient:  · Furosemide  · Losartan  Total # of Interventions Completed: 0  Time Spent (min): 15    Additional Documentation:

## 2020-10-22 NOTE — PLAN OF CARE
Problem: Skin Integrity:  Goal: Will show no infection signs and symptoms  Description: Will show no infection signs and symptoms  Outcome: Met This Shift  Note: Patient is not exhibiting any signs of infection at this time. Patient does have cellulitis in her left lower leg. Patients vital signs have been stable. Patient is not receiving antibiotics at this time. Problem: Skin Integrity:  Goal: Absence of new skin breakdown  Description: Absence of new skin breakdown  Outcome: Met This Shift  Note: Upon admission assessment patient has redness to bilateral lower extremities and redness to her faustino and buttocks area. Patient has not developed any new skin breakdown at this time. Will continue to monitor. Problem: Mood - Altered:  Goal: Absence of abusive behavior  Description: Absence of abusive behavior  Outcome: Met This Shift  Note: Patient has not been abusive towards staff or shown abusive behavior. Patient has been calm, cooperative, and pleasant during shift. Problem: Falls - Risk of:  Goal: Will remain free from falls  Description: Will remain free from falls  Note: Patient has a history of falling at home. Patient is weak in her bilateral lower extremities and requires maximal assistance to ambulate. Patient ambulates using a bird stedy. Patients bed is locked in lowest position with alarm on. Patient does not try to get up without assistance and calls out to staff. Patients call light, bedside table, and personal belongings are within reach. Problem: Falls - Risk of:  Goal: Absence of physical injury  Description: Absence of physical injury  Note: Patient remained free from physical injury during shift. Problem: Injury - Risk of, Physical Injury:  Goal: Will remain free from falls  Description: Will remain free from falls  Note: Patient has a history of falling at home. Patient is weak in her bilateral lower extremities and requires maximal assistance to ambulate.  Patient ambulates using a bird stedy. Patients bed is locked in lowest position with alarm on. Patient does not try to get up without assistance and calls out to staff. Patients call light, bedside table, and personal belongings are within reach. Problem: Injury - Risk of, Physical Injury:  Goal: Absence of physical injury  Description: Absence of physical injury  Note: Patient remained free from physical injury during shift.

## 2020-10-22 NOTE — PROGRESS NOTES
NUTRITION ASSESSMENT  Admission Date: 10/17/2020     Type and Reason for Visit: Reassess    NUTRITION RECOMMENDATIONS:   1. PO Diet: Continue with current diet of low sodium and 1500 ml fluid restriction per MD.   2. ONS: Adding boost pudding BID    NUTRITION ASSESSMENT:  Pt continues to be at nutritional compromise but improving as per nursing report of good po intakes. Noted unconsumed standard ONS by bedside and Pt's daughter indicated that pt may be more accepting of ONS pudding instead. Noted possible D/C, RD will continue to monitor per [facility]. MALNUTRITION ASSESSMENT  Context of Malnutrition: Acute Illness   Malnutrition Status: At risk for malnutrition (Comment)  Findings of the 6 clinical characteristics of malnutrition (Minimum of 2 out of 6 clinical characteristics is required to make the diagnosis of moderate or severe Protein Calorie Malnutrition based on AND/ASPEN Guidelines):  Energy Intake: Less than/equal to 75% of estimated energy requirements    Energy Intake Time: x4 days   Weight Loss %: 7.5% loss or greater    Weight loss Time: Greater than or equal to 3 months   Fluid Accumulation: No significant    Fluid Accumulation Location: No significant     Strength: Not Performed;  Not Measured     NUTRITION DIAGNOSIS   Problem: Problem #1: Predicted inadequate energy intake  Etiology: Insufficient energy/nutrient consumption  Signs & Symptoms: Weight loss     NUTRITION INTERVENTION  Food and/or Nutrient Delivery:Continue Current diet  or Modify Current ONS   Nutrition education/counseling/coordination of care: Continue Inpatient Monitoring     NUTRITION MONITORING & EVALUATION:   Evaluation:Progressing towards goal   Goals:Goals: Pt to tolerate the most appropriate form of nutrition therapy to meet nutritional needs   Monitoring: Chewing/Swallowing , Meal Intake , Supplement Intake  or Weight      OBJECTIVE DATA:  · Nutrition-Focused Physical Findings: +1 BLE edema, AMS  · Wounds Multiple, Unstageable and DTI     No past medical history on file. ANTHROPOMETRICS  Current Height: 5' 8\" (172.7 cm)  Current Weight: 201 lb 4.5 oz (91.3 kg)    Admission weight: 194 lb 10.7 oz (88.3 kg)  Ideal Bodyweight 140 lb   Weight Changes -8 %, 16 lb x 3 months per EMR        BMI BMI (Calculated): 30.7    Wt Readings from Last 50 Encounters:   10/22/20 201 lb 4.5 oz (91.3 kg)       COMPARATIVE STANDARDS  Estimated Total Kcals/Day : 18-22 Current Bodyweight (88 kg) 7152-5389 kcal    Estimated Total Protein (g/day) : 1.2-1.4 Ideal Bodyweight  (64 kg) 77-90 g/day  Estimated Daily Total Fluid (ml/day): 1 mL/kcal per day     Food / Nutrition-Related History  Pre-Admission / Home Diet:  Pre-Admission/Home Diet: General   Home Supplements / Herbals:    none noted  Food Restrictions / Cultural Requests:    none noted    Current Nutrition Therapies   DIET LOW SODIUM 2 GM; 1500 ml  Dietary Nutrition Supplements: Other Oral Supplement (see comment)     PO Intake: 26-50%  PO Supplement: Standard High Calorie    PO Supplement Intake: 26-50%  IVF: none     NUTRITION RISK LEVEL: Risk Level:  Moderate     Shane Diez RD, OMAR  [place]:  [phone]  Office:  [phone]

## 2020-10-22 NOTE — PROGRESS NOTES
promethazine **OR** ondansetron      Intake/Output Summary (Last 24 hours) at 10/22/2020 1835  Last data filed at 10/22/2020 1409  Gross per 24 hour   Intake 670 ml   Output 401 ml   Net 269 ml       Physical Exam Performed:    BP (!) 142/80   Pulse 74   Temp 97.1 °F (36.2 °C) (Oral)   Resp 18   Ht 5' 8\" (1.727 m)   Wt 201 lb 4.5 oz (91.3 kg)   SpO2 94%   BMI 30.60 kg/m²     General appearance: No apparent distress, appears stated age and cooperative. HEENT: Pupils equal, round, and reactive to light. Conjunctivae/corneas clear. Neck: Supple, with full range of motion. No jugular venous distention. Trachea midline. Respiratory:  Normal respiratory effort. Crackles at the bases  Cardiovascular: Regular rate and rhythm with normal S1/S2 without murmurs, rubs or gallops. Abdomen: Soft, non-tender, non-distended with normal bowel sounds. Musculoskeletal: Extensive bruising noted over the buttock area and perineum  Skin: Lower extremity venous stasis changes, trace edema  Neurologic: Generalized weakness, nonfocal exam  Psychiatric: Alert oriented to self and place  Capillary Refill: Brisk,< 3 seconds   Peripheral Pulses: +2 palpable, equal bilaterally   No change of exam since 10/20    Labs:   No results for input(s): WBC, HGB, HCT, PLT in the last 72 hours. Recent Labs     10/20/20  1225 10/21/20  0604 10/22/20  0637   * 135* 134*   K 3.7 3.6 3.5   CL 95* 97* 95*   CO2 29 28 28   BUN 15 13 12   CREATININE 1.0 1.0 1.0   CALCIUM 8.7 8.5 8.8   PHOS 2.8 2.8  --      No results for input(s): AST, ALT, BILIDIR, BILITOT, ALKPHOS in the last 72 hours. No results for input(s): INR in the last 72 hours. No results for input(s): Les Jason in the last 72 hours.     Urinalysis:      Lab Results   Component Value Date    NITRU Negative 10/19/2020    WBCUA 0-2 10/19/2020    BACTERIA Rare 10/19/2020    RBCUA 0-2 10/19/2020    BLOODU Negative 10/19/2020    SPECGRAV 1.015 10/19/2020    GLUCOSEU Negative weakness/debility  5. Hyponatremia -- due to chf, hypokalemia  6. Electrolyte abnormalities  7. Dementia  8. Elevated troponin due to demand ischemia, trend flat  9. Dyslipidemia    Plan:  Echo without wall motion abnormalities, with G2DD  Continue Lasix 40 mg daily  Mild afterload reduction with losartan 12.5 mg p.o. daily. Cardiology on board, discussed with Dr. Onel Hutchinson  Keep K > 4.0, Mag > 2.0  Continue Lipitor  Repeat PT OT evaluation today    DVT Prophylaxis: Lovenox  Diet: DIET LOW SODIUM 2 GM; 1500 ml  Dietary Nutrition Supplements: Other Oral Supplement (see comment)  Code Status: Full Code    PT/OT Eval Status:  13/24 on the AM-PAC short mobility form.   [address] to discharge    Kacey Kemp MD   Hospitalist

## 2020-10-22 NOTE — PLAN OF CARE
Problem: Falls - Risk of:  Goal: Will remain free from falls  Description: Will remain free from falls  10/22/2020 1514 by Patience Leroy RN  Outcome: Met This Shift  Note: Pt alert x 3-4. In bed with alarm on. No attempts made to get up unassisted. Asked pt 3 X this shift to get up in chair and she refused. Will monitor safety. Problem: Skin Integrity:  Goal: Will show no infection signs and symptoms  Description: Will show no infection signs and symptoms  10/22/2020 1514 by Patience Leroy RN  Outcome: Met This Shift     Problem: Confusion - Acute:  Goal: Absence of continued neurological deterioration signs and symptoms  Description: Absence of continued neurological deterioration signs and symptoms  Outcome: Met This Shift     Problem: Injury - Risk of, Physical Injury:  Goal: Will remain free from falls  Description: Will remain free from falls  10/22/2020 1514 by Patience Leroy RN  Outcome: Met This Shift  Note: Pt alert x 3-4. In bed with alarm on. No attempts made to get up unassisted. Asked pt 3 X this shift to get up in chair and she refused. Will monitor safety. Problem: Discharge Planning:  Goal: Ability to perform activities of daily living will improve  Description: Ability to perform activities of daily living will improve  Outcome: Ongoing  Note: D/C plans in progress per SW.

## 2020-10-23 VITALS
OXYGEN SATURATION: 93 % | WEIGHT: 201.28 LBS | HEIGHT: 68 IN | BODY MASS INDEX: 30.51 KG/M2 | HEART RATE: 65 BPM | SYSTOLIC BLOOD PRESSURE: 168 MMHG | DIASTOLIC BLOOD PRESSURE: 82 MMHG | TEMPERATURE: 98.4 F | RESPIRATION RATE: 17 BRPM

## 2020-10-23 LAB
ANION GAP SERPL CALCULATED.3IONS-SCNC: 13 MMOL/L (ref 3–16)
BUN BLDV-MCNC: 11 MG/DL (ref 7–20)
CALCIUM SERPL-MCNC: 8.6 MG/DL (ref 8.3–10.6)
CHLORIDE BLD-SCNC: 96 MMOL/L (ref 99–110)
CO2: 27 MMOL/L (ref 21–32)
CREAT SERPL-MCNC: 1 MG/DL (ref 0.6–1.2)
GFR AFRICAN AMERICAN: >60
GFR NON-AFRICAN AMERICAN: 52
GLUCOSE BLD-MCNC: 95 MG/DL (ref 70–99)
MAGNESIUM: 2.2 MG/DL (ref 1.8–2.4)
POTASSIUM SERPL-SCNC: 3.1 MMOL/L (ref 3.5–5.1)
SODIUM BLD-SCNC: 136 MMOL/L (ref 136–145)

## 2020-10-23 PROCEDURE — 36415 COLL VENOUS BLD VENIPUNCTURE: CPT

## 2020-10-23 PROCEDURE — 83735 ASSAY OF MAGNESIUM: CPT

## 2020-10-23 PROCEDURE — 6360000002 HC RX W HCPCS: Performed by: INTERNAL MEDICINE

## 2020-10-23 PROCEDURE — 2580000003 HC RX 258: Performed by: INTERNAL MEDICINE

## 2020-10-23 PROCEDURE — 6370000000 HC RX 637 (ALT 250 FOR IP): Performed by: INTERNAL MEDICINE

## 2020-10-23 PROCEDURE — 99233 SBSQ HOSP IP/OBS HIGH 50: CPT | Performed by: INTERNAL MEDICINE

## 2020-10-23 PROCEDURE — 80048 BASIC METABOLIC PNL TOTAL CA: CPT

## 2020-10-23 RX ORDER — LOSARTAN POTASSIUM 25 MG/1
25 TABLET ORAL DAILY
Qty: 30 TABLET | Refills: 3 | Status: SHIPPED | OUTPATIENT
Start: 2020-10-24

## 2020-10-23 RX ORDER — POTASSIUM CHLORIDE 20 MEQ/1
40 TABLET, EXTENDED RELEASE ORAL 2 TIMES DAILY WITH MEALS
Status: DISCONTINUED | OUTPATIENT
Start: 2020-10-23 | End: 2020-10-23 | Stop reason: HOSPADM

## 2020-10-23 RX ORDER — LOSARTAN POTASSIUM 25 MG/1
25 TABLET ORAL DAILY
Status: DISCONTINUED | OUTPATIENT
Start: 2020-10-23 | End: 2020-10-23 | Stop reason: HOSPADM

## 2020-10-23 RX ORDER — FUROSEMIDE 40 MG/1
40 TABLET ORAL DAILY
Qty: 60 TABLET | Refills: 3 | Status: SHIPPED | OUTPATIENT
Start: 2020-10-23 | End: 2020-12-14 | Stop reason: DRUGHIGH

## 2020-10-23 RX ORDER — POTASSIUM CHLORIDE 20 MEQ/1
20 TABLET, EXTENDED RELEASE ORAL DAILY
Qty: 30 TABLET | Refills: 2 | Status: SHIPPED | OUTPATIENT
Start: 2020-10-23 | End: 2020-12-14 | Stop reason: SDUPTHER

## 2020-10-23 RX ADMIN — ENOXAPARIN SODIUM 30 MG: 30 INJECTION SUBCUTANEOUS at 08:58

## 2020-10-23 RX ADMIN — MICONAZOLE NITRATE: 20 POWDER TOPICAL at 08:59

## 2020-10-23 RX ADMIN — FUROSEMIDE 40 MG: 40 TABLET ORAL at 08:58

## 2020-10-23 RX ADMIN — Medication 10 ML: at 08:59

## 2020-10-23 RX ADMIN — POTASSIUM CHLORIDE 40 MEQ: 1500 TABLET, EXTENDED RELEASE ORAL at 12:21

## 2020-10-23 RX ADMIN — LOSARTAN POTASSIUM 25 MG: 25 TABLET, FILM COATED ORAL at 10:21

## 2020-10-23 ASSESSMENT — PAIN SCALES - GENERAL
PAINLEVEL_OUTOF10: 0

## 2020-10-23 NOTE — CARE COORDINATION
/24      DISCHARGE Disposition: Home with Home Health Care: SN PT OT  SW      LOC at discharge: Not Applicable  YENNIFER Completed: No    Notification completed in HENS/PAS?:  Not Applicable    IMM Completed:   Yes, Case management has presented and reviewed IMM letter #2 to the patient and/or family/ POA. Patient and/or family/POA verbalized understanding of their medicare rights and appeal process if needed. Patient and/or family/POA has signed, initialed and placed today's date (10/21/2020) and time (per  document ) on IMM letter #2 on the the appropriate lines. Patient and/or family/POA, copy of letter offered and they are aware that this original copy of IMM letter #2 is available prior to discharge from the paper chart on the unit. Electronic documentation has been entered into epic for IMM letter #2 and original paper copy has been added to the paper chart at the nurses station. Transportation:  Transportation PLAN for discharge: EMS transportation   Mode of Transport: Ambulance stretcher - BLS  Reason for medical transport: Bed confined: Meets the following criteria 1) unable to get out of bed without assistance or ambulate, 2) unable to safely sit up in a wheelchair, 3) unable to maintain erect seating position in a chair for time needed for transport  Name of [address]: [address]  Phone: [phone]  Time of Transport: 1230    Transport form completed: Yes    Home Care:  [address] ordered at discharge: Yes  [address]:  [address]                       [place] [address].  [facility] [address]         Phone: [phone]          Fax: [phone]          Orders faxed: Yes    Durable Medical Equipment:  DME Provider: NA  Equipment obtained during hospitalization: na    Home Oxygen and Respiratory Equipment:  Oxygen needed at discharge?: No  [address]: Not Applicable  Portable tank available for discharge?: No    Dialysis:  Dialysis patient: No    Dialysis Center:  Not Applicable    Hospice Services:  Location: Not Applicable  Agency: Not Applicable    Consents signed: No    Referrals made at [facility] for outpatient continued care:  [place] [facility]    Additional CM Notes:     Cm confirmed  D/C with pt and daughter  Emmet Schilder :  ( after  Medicare  Appeal was up held:  Pt to d/c home w/ [facility] with [facility] as well , private duty :  Referral made to [facility]  , [facility] faxed  referral , to  if eligible for any home services ; Pt rec SNF  , pt dgtr  Declining d/t to quarantining requirements and no visitors . The Plan for Transition of Care is related to the following treatment goals of Altered mental state [R41.82]  Altered mental state [R41.82]    The Patient and/or patient representative Melburn Felty and her family were provided with a choice of provider and agrees with the discharge plan Yes    Freedom of choice list was provided with basic dialogue that supports the patient's individualized plan of care/goals and shares the quality data associated with the providers.  Yes    Care Transitions patient: No    Xiao Coburn 70  Case Management Department  Ph: [phone]

## 2020-10-23 NOTE — PROGRESS NOTES
Patient discharging home with son at bedside. IV and telemetry box removed. All personal belongings packed and sent with patient. Discharge instructions and prescriptions reviewed with son, all questions answered. Patient leaving the unit via stretcher per Ambulance transport.

## 2020-10-23 NOTE — PROGRESS NOTES
at 10/23/2020 1317  Last data filed at 10/23/2020 1010  Gross per 24 hour   Intake 320 ml   Output 800 ml   Net -480 ml     I/O last 3 completed shifts: In: 5 [P.O.:420]  Out: 675 [Urine:675]  Wt Readings from Last 3 Encounters:   10/22/20 201 lb 4.5 oz (91.3 kg)       Admit Wt: Weight: 194 lb 10.7 oz (88.3 kg)   Todays Wt: Weight: 201 lb 4.5 oz (91.3 kg)    TELEMETRY: Sinus     Physical Exam:         General Appearance:  Alert, cooperative, no distress, appears stated age Appropriate weight   Head:  Normocephalic, without obvious abnormality, atraumatic   Eyes:  PERRL, conjunctiva/corneas clear EOM intact  Ears normal   Throat no lesions       Nose: Nares normal, no drainage or sinus tenderness   Throat: Lips, mucosa, and tongue normal   Neck: Supple, symmetrical, trachea midline, no adenopathy, thyroid: not enlarged, symmetric, no tenderness/mass/nodules, no carotid bruit. Lungs:   Normal respiratory rate, lungs clear to auscultation without any wheezes, rubs or ronchi bilaterally. Chest Wall:  No tenderness or deformity   Heart:  Regular rhythm, rate is controlled, S1, S2 normal, there is no murmur, there is no rub or gallop, no jvd, no bilateral lower extremity edema   Abdomen:   Soft, non-tender, bowel sounds active all four quadrants,  no masses, no organomegaly       Extremities: Extremities normal, atraumatic, no cyanosis. Pulses: 2+ and symmetric   Skin: Skin color, texture, turgor normal, no rashes or lesions   Pysch: Normal mood and affect   Neurologic: Normal gross motor and sensory exam.  Cranial nerves intact       Labs:   Recent Labs     10/20/20  1500 10/21/20  0604 10/22/20  0637 10/23/20  0545   NA  --  135* 134* 136   K  --  3.6 3.5 3.1*   BUN  --  13 12 11   CREATININE  --  1.0 1.0 1.0   CL  --  97* 95* 96*   CO2  --  28 28 27   GLUCOSE  --  89 96 95   CALCIUM  --  8.5 8.8 8.6   MG 2.10  --  1.70* 2.20     No results for input(s): WBC, HGB, HCT, PLT, MCV in the last 72 hours.   No results for input(s): CHOLTOT, TRIG, HDL in the last 72 hours. Invalid input(s): LIPIDCOMM, CHOLHDL, VLDCHOL, LDL  No results for input(s): PTT, INR in the last 72 hours. Invalid input(s): PT  No results for input(s): CKTOTAL, CKMB, CKMBINDEX, TROPONINI in the last 72 hours. No results for input(s): BNP in the last 72 hours. No results for input(s): NTPROBNP in the last 72 hours. No results for input(s): TSH in the last 72 hours. Imaging:       Assessment & Plan:     1. Chronic HFpEF:  Patient was euvolemic and hemodynamically stable on admission.     -Patient used to be on torsemide 60 mg daily; agree with Lasix 40 mg daily upon discharge     2. Severe MR: It is most likely due to severe mitral annular calcification. It is structural in etiology.      -Conservative approach  -Will increase losartan to 25 daily.      3.  Essential hypertension:  Patient's BP was mildly elevated during this admission.     -We will treat with losartan per #2.     Patient may be discharged home today on the above meds and follow up with me in 1 week with a BMP prior to visit. Please call me with any questions. I have spent 35 minutes of face to face time with the patient with more than 50% spent counseling and coordinating care. I have personally reviewed the reports and images of labs, radiological studies, cardiac studies including ECG's and telemetry, current and old medical records. The note was completed using EMR and Dragon dictation system. Every effort was made to ensure accuracy; however, inadvertent computerized transcription errors may be present. All questions and concerns were addressed to the patient/family. Alternatives to my treatment were discussed. Thank you for allowing to us to participate in the [facility]. Please call our service with questions.     Javier Celeste MD, [facility] - [address]  [address] [place] [address]  Ph: [phone]  Fax: [phone]

## 2020-10-23 NOTE — PLAN OF CARE
Problem: Falls - Risk of:  Goal: Will remain free from falls  Description: Will remain free from falls  10/23/2020 0405 by Rojelio Stokes RN  Outcome: Ongoing  Note: Patient at risk for falls. Patient resting quietly in bed. Side rails up x 3 and non-skid socks on. Bed locked in lowest position. Bed alarm on. Bedside table and call light within reach. Patient instructed to call for assistance. Pt ambulates with bird steady and standby x2. Patient verbalized understanding. Problem: Falls - Risk of:  Goal: Absence of physical injury  Description: Absence of physical injury  Outcome: Ongoing     Problem: Skin Integrity:  Goal: Will show no infection signs and symptoms  Description: Will show no infection signs and symptoms  10/23/2020 0405 by Rojelio Stokes RN  Outcome: Ongoing     Problem: Discharge Planning:  Goal: Ability to perform activities of daily living will improve  Description: Ability to perform activities of daily living will improve  10/23/2020 0405 by Rojelio Stokes RN  Outcome: Ongoing  Note: Pt to be d/c to home with daughter and Evie Mejia. Activity/care levels discussed with pt and daughter. Daughter verbalizes understanding. Problem: Injury - Risk of, Physical Injury:  Goal: Will remain free from falls  Description: Will remain free from falls  10/23/2020 0405 by Rojelio Stokes RN  Outcome: Ongoing  Note: Patient at risk for falls. Patient resting quietly in bed. Side rails up x 3 and non-skid socks on. Bed locked in lowest position. Bed alarm on. Bedside table and call light within reach. Patient instructed to call for assistance. Pt ambulates with bird steady and standby x2. Patient verbalized understanding. Problem: Mood - Altered:  Goal: Absence of abusive behavior  Description: Absence of abusive behavior  Outcome: Ongoing  Note: Pt shows no signs of verbal/abusive behavior this shift.  Pt is resting comfortably in bed with eyes closed at this time.

## 2020-10-26 NOTE — DISCHARGE SUMMARY
Hospital Medicine Discharge Summary    Patient ID: Delvis Bernard      Patient's PCP: Kristina Phillips    Admit Date: 10/17/2020     Discharge Date: 10/23/2020    Admitting Physician: El Almaraz MD     Discharge Physician: Rayna Renee MD     Discharge Diagnoses:  1. Falls at home, reported as mechanical fall, unwitnessed and a possible syncope  2. Acute diastolic heart failure, new diagnosis  3. Episode of tachycardia, SVT HR 150s, 7 secs  4. Generalized weakness/debility  5. Hyponatremia -- due to chf, hypokalemia  6. Electrolyte abnormalities  7. Dementia  8. Elevated troponin due to demand ischemia, trend flat  9. Dyslipidemia     The patient was seen and examined on day of discharge and this discharge summary is in conjunction with any daily progress note from day of discharge. History of presenting illness  59-year-old female with history of dementia presented to the hospital after a fall at home. Lives at home with daughter. As per admission note daughter stepped out for a couple of hours and when she came back she found her mother to be in supine position on the ground. Reports she slipped and fell. It was unwitnessed. It is reported that she has been frequently falling. According to the ER notes patient has ambulated with the walker via EMS and found to have a fever of 103 °F.  Afebrile while in ED.     Information from Daughter Aliza Christian -- Patient has been living with her daughter since April 2020  Has had multiple falls since then. Daughter tells me that she sits in a rocking chair and goes back and forth multiple times and sometimes she slips and skids onto the floor in the chair goes backwards. She does not feel that patient loses consciousness but not sure as mostly these falls are unwitnessed. There are reports of leg swelling with weeping for the last few months. Patient uses a walker at baseline but has been progressively getting weaker the last few weeks or months. She is able to dress herself in the morning but needs help with dressing in the evening, daughter does the cooking rest of the ADLs for the patient. Hospital Course: For further work-up of possible syncopal falls patient underwent echo with did not show wall motion normalities, showed grade 2 diastolic dysfunction, patient had chronic venous stasis changes on exam, started on Lasix 40 mg once daily. Patient also has severe mitral regurgitation and losartan was started for afterload reduction. Patient was seen by physical therapy and Occupational Therapy, initially plan for skilled nursing facility but due to COVID-19 concern these days patient discharged home with daughter. Patient will follow with heart failure clinic. Physical Exam Performed:     BP (!) 168/82   Pulse 65   Temp 98.4 °F (36.9 °C) (Oral)   Resp 17   Ht 5' 8\" (1.727 m)   Wt 201 lb 4.5 oz (91.3 kg)   SpO2 93%   BMI 30.60 kg/m²       General appearance: No apparent distress, appears stated age and cooperative. HEENT: Pupils equal, round, and reactive to light. Conjunctivae/corneas clear. Neck: Supple, with full range of motion. No jugular venous distention. Trachea midline. Respiratory:  Normal respiratory effort. Crackles at the bases  Cardiovascular: Regular rate and rhythm with normal S1/S2 without murmurs, rubs or gallops. Abdomen: Soft, non-tender, non-distended with normal bowel sounds. Musculoskeletal: Extensive bruising noted over the buttock area and perineum  Skin: Lower extremity venous stasis changes, trace edema  Neurologic: Generalized weakness, nonfocal exam  Psychiatric: Alert oriented to self and place  Capillary Refill: Brisk,< 3 seconds   Peripheral Pulses: +2 palpable, equal bilaterally   No change of exam since 10/20    Labs:  For convenience and continuity at follow-up the following most recent labs are provided:      CBC:    Lab Results   Component Value Date    WBC 3.4 10/19/2020    HGB 12.1 10/19/2020 HCT 36.6 10/19/2020     10/19/2020       Renal:    Lab Results   Component Value Date     10/23/2020    K 3.1 10/23/2020    K 3.0 10/18/2020    CL 96 10/23/2020    CO2 27 10/23/2020    BUN 11 10/23/2020    CREATININE 1.0 10/23/2020    CALCIUM 8.6 10/23/2020    PHOS 2.8 10/21/2020         Significant Diagnostic Studies    Radiology:   CT CERVICAL SPINE WO CONTRAST   Final Result   1. No acute findings. 2. Degenerative changes contributing to central canal foraminal narrowing as detailed above. FL MODIFIED BARIUM SWALLOW W VIDEO   Final Result      As above. CT CHEST ABDOMEN PELVIS W CONTRAST   Final Result      CHEST:      1.  Very large hiatal hernia containing nondilated transverse colon and the entire stomach in the right lower chest.   2.  Mild to moderate right lower lobe atelectasis. No evidence of pneumonia. ABDOMEN/PELVIS:      1.  Large right inguinal hernia containing nondilated distal ileum and proximal colon. No evidence of bowel obstruction. 2.  Cholelithiasis. 3.  Right renal cortical atrophy. 4.  Urinary bladder diverticulum. 5.  Colonic diverticulosis. 6.  Chronic L5 compression fracture with retropulsion resulting in severe L4-5 spinal canal stenosis, similar compared to report from outside hospital MR lumbar spine dated 8/16/2019   7. Severe left hip osteoarthritis. No acute fracture. CT Head WO Contrast   Final Result      1. No acute intracranial process. 2.  Moderate cerebral atrophy and mild chronic small vessel ischemic disease. XR CHEST PORTABLE   Final Result      Right pleural effusion and right lower lobe atelectasis as well as possible left upper lobe collapse. XR HIP BILATERAL W AP PELVIS (2 VIEWS)   Final Result   Impression:    No acute osseous injury. Severe left hip osteoarthritis.                 Consults:     IP CONSULT TO HOSPITALIST  IP CONSULT TO HEART FAILURE NURSE/COORDINATOR  IP CONSULT TO PHARMACY  IP

## 2020-12-14 ENCOUNTER — OFFICE VISIT (OUTPATIENT)
Dept: CARDIOLOGY CLINIC | Age: 85
End: 2020-12-14
Payer: MEDICARE

## 2020-12-14 VITALS — WEIGHT: 204.2 LBS | TEMPERATURE: 96.9 F | BODY MASS INDEX: 30.95 KG/M2 | HEIGHT: 68 IN

## 2020-12-14 PROCEDURE — G8417 CALC BMI ABV UP PARAM F/U: HCPCS | Performed by: NURSE PRACTITIONER

## 2020-12-14 PROCEDURE — 1123F ACP DISCUSS/DSCN MKR DOCD: CPT | Performed by: NURSE PRACTITIONER

## 2020-12-14 PROCEDURE — 1090F PRES/ABSN URINE INCON ASSESS: CPT | Performed by: NURSE PRACTITIONER

## 2020-12-14 PROCEDURE — G8427 DOCREV CUR MEDS BY ELIG CLIN: HCPCS | Performed by: NURSE PRACTITIONER

## 2020-12-14 PROCEDURE — 4004F PT TOBACCO SCREEN RCVD TLK: CPT | Performed by: NURSE PRACTITIONER

## 2020-12-14 PROCEDURE — G8484 FLU IMMUNIZE NO ADMIN: HCPCS | Performed by: NURSE PRACTITIONER

## 2020-12-14 PROCEDURE — 99214 OFFICE O/P EST MOD 30 MIN: CPT | Performed by: NURSE PRACTITIONER

## 2020-12-14 PROCEDURE — 4040F PNEUMOC VAC/ADMIN/RCVD: CPT | Performed by: NURSE PRACTITIONER

## 2020-12-14 RX ORDER — POTASSIUM CHLORIDE 20 MEQ/1
20 TABLET, EXTENDED RELEASE ORAL 2 TIMES DAILY
Qty: 180 TABLET | Refills: 3 | Status: SHIPPED | OUTPATIENT
Start: 2020-12-14 | End: 2021-04-21

## 2020-12-14 RX ORDER — FUROSEMIDE 40 MG/1
40 TABLET ORAL 2 TIMES DAILY
Qty: 180 TABLET | Refills: 3 | Status: SHIPPED | OUTPATIENT
Start: 2020-12-14 | End: 2021-04-21

## 2020-12-14 NOTE — PATIENT INSTRUCTIONS
Increase lasix to 40 mg po twice a day for 5 day  Increase Klor-Con to 20 meq twice a day for 5 days    Then resume once a day    Check labs next week

## 2020-12-14 NOTE — PROGRESS NOTES
CC/HPI:    80 y.o. patient of Dr Sherif Garcia here for HFpEF, severe MR, HTN and HLD. Pt has dementia so daughter helped with interview. She c/o RUELAS, orthopnea, LE edema and weight gain. She has occasional cough. Denies cp, LH/dizziness, palpitations or syncope. Has hx of falls but none since hospitalization. No n/v/d or GI/ bleeding. No fever/chills. Has a good appetite and is frequently hungry. Daughter is trying to limit salt. Has difficulty placing compression stockings. Does not weigh daily. No past medical history on file. No past surgical history on file. No family history on file. Social History     Tobacco Use    Smoking status: Never Smoker    Smokeless tobacco: Never Used   Substance Use Topics    Alcohol use: Not on file    Drug use: Not on file     Allergies:Aspirin, Food, and Penicillins    Review of Systems  General: No changes in  fatigue, or night sweats. HEENT: No blurry or decreased vision. No changes in hearing, nasal discharge or sore throat. Cardiovascular:  See HPI. Respiratory: No hemoptysis, or wheezing. Gastrointestinal:  No abdominal pain, hematochezia, melana, constipation, diarrhea, or history of GI ulcers. Genito-Urinary: No dysuria or hematuria. No urgency or polyuria. Musculoskeletal:  No complaints of joint pain, joint swelling or muscular weakness/soreness. Neurological:  No dizziness, headaches, numbness/tingling, speech problems or weakness. Psychological:  No anxiety or depression. Hematological and Lymphatic: No abnormal bleeding or bruising, blood clots, jaundice or swollen lymph nodes. Endocrine:   No malaise/lethargy, palpitations, polydipsia/polyuria, temperature intolerance or unexpected weight changes  Skin:  No rashes or non-healing ulcers.     Physical Exam:  Temp 96.9 °F (36.1 °C)   Ht 5' 8\" (1.727 m)   Wt 204 lb 3.2 oz (92.6 kg)   BMI 31.05 kg/m²    General (appearance):  No acute distress  Eyes: anicteric   Neck: soft, No JVD  Ears/Nose/Mouth/Thorat: No cyanosis  CV: RRR soft YAQUELIN   Respiratory:  Mild rhonchi, improves with deep breathing  GI: soft, non-tender, non-distended  Skin: Warm, dry. No rashes  Neuro/Psych: Alert and cooperative. Appropriate behavior  Ext:  No c/c. 1+ pitting BLE edema  Pulses:  2+ carotid     Weight  Wt Readings from Last 3 Encounters:   12/14/20 204 lb 3.2 oz (92.6 kg)   10/22/20 201 lb 4.5 oz (91.3 kg)          CBC:   Lab Results   Component Value Date    WBC 3.4 (L) 10/19/2020    HGB 12.1 10/19/2020    HCT 36.6 10/19/2020    MCV 92.1 10/19/2020     10/19/2020     BMP:  Lab Results   Component Value Date    CREATININE 1.0 11/06/2020    BUN 14 11/06/2020     11/06/2020    K 4.2 11/06/2020    CL 99 11/06/2020    CO2 29 11/06/2020     Mag:   Lab Results   Component Value Date    MG 2.20 10/23/2020     LIVER PROFILE:   Lab Results   Component Value Date    ALT 15 10/17/2020    AST 26 10/17/2020    ALKPHOS 118 10/17/2020    BILITOT 0.5 10/17/2020     PT/INR:   Lab Results   Component Value Date    INR 1.07 10/17/2020    PROTIME 12.4 10/17/2020     Pro-BNP   Lab Results   Component Value Date    PROBNP 2,453 10/19/2020    PROBNP 8,504 10/17/2020     LIPIDS:  No components found for: CHLPL  No results found for: TRIG  No results found for: HDL  No results found for: LDLCALC  No results found for: LABVLDL  TSH:No results found for: TSH, E6SRETM, O8SLTYT, THYROIDAB    IMAGING:     10/2020 Echo:   Normal left ventricle size. There is mild concentric left ventricular   hypertrophy. Overall left ventricular systolic function appears normal with   an ejection fraction of 55-60%. No regional wall motion abnormalities are   noted. Diastolic filling parameters suggest grade II diastolic dysfunction. Increased LVEDP. Severe mitral annular calcification is present. Mitral valve leaflets appear calcific/thickened but open adequately. Moderate to severe eccentric mitral regurgitation is present.    No evidence of mitral valve stenosis. Medications:   Current Outpatient Medications   Medication Sig Dispense Refill    furosemide (LASIX) 40 MG tablet Take 1 tablet by mouth 2 times daily 180 tablet 3    potassium chloride (KLOR-CON M) 20 MEQ extended release tablet Take 1 tablet by mouth 2 times daily 180 tablet 3    losartan (COZAAR) 25 MG tablet Take 1 tablet by mouth daily 30 tablet 3    levothyroxine (SYNTHROID) 75 MCG tablet Take 75 mcg by mouth Daily      pravastatin (PRAVACHOL) 20 MG tablet Take 40 mg by mouth nightly       Multiple Vitamins-Minerals (THERAPEUTIC MULTIVITAMIN-MINERALS) tablet Take 1 tablet by mouth nightly        No current facility-administered medications for this visit. Assessment:  1. Acute on chronic heart failure with preserved ejection fraction ([address]    2. Severe mitral regurgitation    3. Essential hypertension    4.  Mixed hyperlipidemia        Plan:  Acute/chornic HFpEF and severe MR   Increase lasix to 40 mg po bid x 5 days, then resume once daily   Increase KCL to 20 meq po bid x 5 days then resume once daily   BMP/BNP next week   Daily weights call with 3 lbs weight gain    Compression stockings   Low salt diet  HTN   Losartan  HLD   Pravastatin     Follow up in 2 week if symptoms do not improve    Follow up with Dr Becki Gallegos in 3 months

## 2020-12-18 ENCOUNTER — TELEPHONE (OUTPATIENT)
Dept: CARDIOLOGY CLINIC | Age: 85
End: 2020-12-18

## 2020-12-18 NOTE — TELEPHONE ENCOUNTER
Viola with [facility]   Patient looking for labs orders.  I fax lab orders to viola at [phone]  [facility]

## 2021-03-19 ENCOUNTER — OFFICE VISIT (OUTPATIENT)
Dept: CARDIOLOGY CLINIC | Age: 86
End: 2021-03-19
Payer: MEDICARE

## 2021-03-19 VITALS
DIASTOLIC BLOOD PRESSURE: 100 MMHG | BODY MASS INDEX: 30.87 KG/M2 | WEIGHT: 203 LBS | HEART RATE: 72 BPM | SYSTOLIC BLOOD PRESSURE: 150 MMHG

## 2021-03-19 DIAGNOSIS — I34.0 SEVERE MITRAL REGURGITATION: ICD-10-CM

## 2021-03-19 DIAGNOSIS — I10 ESSENTIAL HYPERTENSION: Primary | ICD-10-CM

## 2021-03-19 DIAGNOSIS — I50.32 CHRONIC HEART FAILURE WITH PRESERVED EJECTION FRACTION (HFPEF) (HCC): ICD-10-CM

## 2021-03-19 DIAGNOSIS — I47.1 SVT (SUPRAVENTRICULAR TACHYCARDIA) (HCC): ICD-10-CM

## 2021-03-19 PROCEDURE — 4040F PNEUMOC VAC/ADMIN/RCVD: CPT | Performed by: INTERNAL MEDICINE

## 2021-03-19 PROCEDURE — G8427 DOCREV CUR MEDS BY ELIG CLIN: HCPCS | Performed by: INTERNAL MEDICINE

## 2021-03-19 PROCEDURE — 1123F ACP DISCUSS/DSCN MKR DOCD: CPT | Performed by: INTERNAL MEDICINE

## 2021-03-19 PROCEDURE — G8484 FLU IMMUNIZE NO ADMIN: HCPCS | Performed by: INTERNAL MEDICINE

## 2021-03-19 PROCEDURE — G8417 CALC BMI ABV UP PARAM F/U: HCPCS | Performed by: INTERNAL MEDICINE

## 2021-03-19 PROCEDURE — 1036F TOBACCO NON-USER: CPT | Performed by: INTERNAL MEDICINE

## 2021-03-19 PROCEDURE — 99214 OFFICE O/P EST MOD 30 MIN: CPT | Performed by: INTERNAL MEDICINE

## 2021-03-19 PROCEDURE — 1090F PRES/ABSN URINE INCON ASSESS: CPT | Performed by: INTERNAL MEDICINE

## 2021-03-20 PROBLEM — I47.1 SVT (SUPRAVENTRICULAR TACHYCARDIA) (HCC): Status: ACTIVE | Noted: 2021-03-20

## 2021-03-20 PROBLEM — I50.32 CHRONIC HEART FAILURE WITH PRESERVED EJECTION FRACTION (HFPEF) (HCC): Status: ACTIVE | Noted: 2020-10-21

## 2021-03-21 NOTE — PROGRESS NOTES
Cc: HFpEF, severe MR, SVT    HPI:     Sharon Elmore a 80 y. o. female with a past medical history of dementia, frequent falls, hypertension, HFpEF, severe MR, SVT, code status FULL. Echo 10/17/2020: Normal except for diastolic dysfunction stage II, moderate to severe eccentric mitral regurgitation with severe MAC.     Patient was admitted at [facility] 10/17 - 10/23/21 after she sustained a fall, possible syncope and was found to have AHF and SVT. Patient is here for a follow up. She reports no complaints. She does not check her BP at home and does not limit her salt intake. Histories     Past Medical History:   has no past medical history on file. Surgical History:   has no past surgical history on file. Social History:   reports that she has never smoked. She has never used smokeless tobacco.     Family History:  No evidence for sudden cardiac death or premature CAD      Medications:     Home medications were reviewed and are listed below    Prior to Admission medications    Medication Sig Start Date End Date Taking? Authorizing Provider   furosemide (LASIX) 40 MG tablet Take 1 tablet by mouth 2 times daily  Patient taking differently: Take 40 mg by mouth daily  12/14/20  Yes ALEXANDRA Valdivia CNP   potassium chloride (KLOR-CON M) 20 MEQ extended release tablet Take 1 tablet by mouth 2 times daily  Patient taking differently: Take 20 mEq by mouth daily  12/14/20  Yes ALEXANDRA Valdivia CNP   losartan (COZAAR) 25 MG tablet Take 1 tablet by mouth daily 10/24/20  Yes Federico Kaiser MD   levothyroxine (SYNTHROID) 75 MCG tablet Take 75 mcg by mouth Daily   Yes Historical Provider, MD   pravastatin (PRAVACHOL) 20 MG tablet Take 40 mg by mouth nightly    Yes Historical Provider, MD   Multiple Vitamins-Minerals (THERAPEUTIC MULTIVITAMIN-MINERALS) tablet Take 1 tablet by mouth nightly    Yes Historical Provider, MD          Allergy:     Aspirin, Food, and Penicillins       Review of Systems:      All 12 point review of symptoms completed. Pertinent positives identified in the HPI, all other review of symptoms negative as below. CONSTITUTIONAL: No fatigue  SKIN: No rash or pruritis. EYES: No visual changes or diplopia. No scleral icterus. ENT: No Headaches, hearing loss or vertigo. No mouth sores or sore throat. CARDIOVASCULAR: No chest pain/chest pressure/chest discomfort. No palpitations. No edema. RESPIRATORY: No dyspnea. No cough or wheezing, no sputum production. GASTROINTESTINAL: No N/V/D. No abdominal pain, appetite loss, blood in stools. GENITOURINARY: No dysuria, trouble voiding, or hematuria. MUSCULOSKELETAL:  No gait disturbance, weakness or joint complaints. NEUROLOGICAL: No headache, diplopia, change in muscle strength, numbness or tingling. No change in gait, balance, coordination, mood, affect, memory, mentation, behavior. PSHYCH: No anxiety, loss of interest, change in sexual behavior, feelings of self-harm, or confusion. ENDOCRINE: No excessive thirst, fluid intake, or urination. No tremor. HEMATOLOGIC: No abnormal bruising or bleeding. ALLERGY: No nasal congestion or hives.       Physical Examination:     Vitals:    03/19/21 1522 03/19/21 1529   BP: (!) 138/90 (!) 150/100   Pulse: 72    Weight: 203 lb (92.1 kg)        Wt Readings from Last 3 Encounters:   03/19/21 203 lb (92.1 kg)   12/14/20 204 lb 3.2 oz (92.6 kg)   10/22/20 201 lb 4.5 oz (91.3 kg)         General Appearance:  Alert, cooperative, no distress, appears stated age Appropriate weight   Head:  Normocephalic, without obvious abnormality, atraumatic   Eyes:  PERRL, conjunctiva/corneas clear EOM intact  Ears normal   Throat no lesions       Nose: Nares normal, no drainage or sinus tenderness   Throat: Lips, mucosa, and tongue normal   Neck: Supple, symmetrical, trachea midline, no adenopathy, thyroid: not enlarged, symmetric, no tenderness/mass/nodules, no carotid bruit       Lungs:   Clear to auscultation bilaterally, respirations unlabored   Chest Wall:  No tenderness or deformity   Heart:  Regular rhythm, rate is controlled, S1, S2 normal, there is II/VI apical holosystolic murmur, there is no rub or gallop, no jvd, no bilateral lower extremity edema   Abdomen:   Soft, non-tender, bowel sounds active all four quadrants,  no masses, no organomegaly       Extremities: Extremities normal, atraumatic, no cyanosis   Pulses: 2+ and symmetric   Skin: Skin color, texture, turgor normal, no rashes or lesions   Pysch: Normal mood and affect   Neurologic: Normal gross motor and sensory exam.  Cranial nerves intact        Labs:     Lab Results   Component Value Date    WBC 3.4 (L) 10/19/2020    HGB 12.1 10/19/2020    HCT 36.6 10/19/2020    MCV 92.1 10/19/2020     10/19/2020     Lab Results   Component Value Date     12/22/2020    K 4.0 12/22/2020    CL 98 (L) 12/22/2020    CO2 29 12/22/2020    BUN 22 (H) 12/22/2020    CREATININE 1.0 12/22/2020    GLUCOSE 71 12/22/2020    CALCIUM 9.4 12/22/2020    PROT 7.8 10/17/2020    LABALBU 3.4 10/21/2020    BILITOT 0.5 10/17/2020    ALKPHOS 118 10/17/2020    AST 26 10/17/2020    ALT 15 10/17/2020    LABGLOM 52 (A) 12/22/2020    GFRAA >60 12/22/2020         No results found for: CHOL  No results found for: TRIG  No results found for: HDL  No results found for: LDLCHOLESTEROL, LDLCALC  No results found for: LABVLDL, VLDL  No results found for: [facility]    Lab Results   Component Value Date    INR 1.07 10/17/2020    PROTIME 12.4 10/17/2020       The ASCVD Risk score (Karina Lott., et al., 2013) failed to calculate for the following reasons: The 2013 ASCVD risk score is only valid for ages 36 to 78      Assessment / Plan:      Diagnosis Orders   1. Essential hypertension     2. Chronic heart failure with preserved ejection fraction (HFpEF) ([address]     3. Severe mitral regurgitation     4. SVT (supraventricular tachycardia) ([address]          1.  Chronic HFpEF:   Patient is now euvolemic but hypertensive.     -Will ask her to start checking her BP at home frequently and call us with values.   -Cw lasix 40 daily  -Low salt diet    2. SVT:   No symptoms    -If recurrence, will consider a BB    3 . Essential HTN:   BP is elevated today in the clinic, unknown BP at home.     -Per #1  -Cw losartan 25 daily  -Low salt diet    4. Mitral regurgitation:   LV size and function remains normal and patient is asymptomatic.     -Close monitoring. Return in about 3 months (around 6/19/2021). I have spent 35 minutes of face to face time with the patient with more than 50% spent counseling and coordinating care. I have personally reviewed the reports and images of labs, radiological studies, cardiac studies including ECG's and telemetry, current and old medical records. The note was completed using EMR and Dragon dictation system. Every effort was made to ensure accuracy; however, inadvertent computerized transcription errors may be present. All questions and concerns were addressed to the patient/family. Alternatives to my treatment were discussed. I would like to thank you for providing me the opportunity to participate in the care of your patient. If you have any questions, please do not hesitate to contact me.      Leonila Hopper MD, [facility] - [address] [facility] Phone: [phone]  Heart Failure Hotline: [phone]  Fax: [phone]

## 2021-04-07 ENCOUNTER — TELEPHONE (OUTPATIENT)
Dept: CARDIOLOGY CLINIC | Age: 86
End: 2021-04-07

## 2021-04-07 NOTE — TELEPHONE ENCOUNTER
----- Message from Florentin Hammond MD sent at 3/20/2021  8:28 PM EDT -----  Regarding: call for BP's  Hi Yeny, can you please call patient in 2 weeks and find out about her BP and HR values the two weeks after her visit? If she has not recorded anything, please have her come back to the clinic with a caretaker (of note, she does have a history of dementia).  Thank you

## 2021-04-21 ENCOUNTER — OFFICE VISIT (OUTPATIENT)
Dept: CARDIOLOGY CLINIC | Age: 86
End: 2021-04-21
Payer: MEDICARE

## 2021-04-21 ENCOUNTER — NURSE ONLY (OUTPATIENT)
Dept: CARDIOLOGY CLINIC | Age: 86
End: 2021-04-21

## 2021-04-21 VITALS
BODY MASS INDEX: 30.96 KG/M2 | WEIGHT: 203.6 LBS | HEART RATE: 76 BPM | SYSTOLIC BLOOD PRESSURE: 160 MMHG | DIASTOLIC BLOOD PRESSURE: 86 MMHG

## 2021-04-21 DIAGNOSIS — I50.32 CHRONIC HEART FAILURE WITH PRESERVED EJECTION FRACTION (HFPEF) (HCC): ICD-10-CM

## 2021-04-21 DIAGNOSIS — I34.0 SEVERE MITRAL REGURGITATION: ICD-10-CM

## 2021-04-21 DIAGNOSIS — I47.1 SVT (SUPRAVENTRICULAR TACHYCARDIA) (HCC): Primary | ICD-10-CM

## 2021-04-21 DIAGNOSIS — I10 ESSENTIAL HYPERTENSION: ICD-10-CM

## 2021-04-21 PROCEDURE — 4040F PNEUMOC VAC/ADMIN/RCVD: CPT | Performed by: INTERNAL MEDICINE

## 2021-04-21 PROCEDURE — 93246 EXT ECG>7D<15D RECORDING: CPT | Performed by: INTERNAL MEDICINE

## 2021-04-21 PROCEDURE — 99215 OFFICE O/P EST HI 40 MIN: CPT | Performed by: INTERNAL MEDICINE

## 2021-04-21 PROCEDURE — G8417 CALC BMI ABV UP PARAM F/U: HCPCS | Performed by: INTERNAL MEDICINE

## 2021-04-21 PROCEDURE — 1036F TOBACCO NON-USER: CPT | Performed by: INTERNAL MEDICINE

## 2021-04-21 PROCEDURE — G8427 DOCREV CUR MEDS BY ELIG CLIN: HCPCS | Performed by: INTERNAL MEDICINE

## 2021-04-21 PROCEDURE — 1090F PRES/ABSN URINE INCON ASSESS: CPT | Performed by: INTERNAL MEDICINE

## 2021-04-21 PROCEDURE — 1123F ACP DISCUSS/DSCN MKR DOCD: CPT | Performed by: INTERNAL MEDICINE

## 2021-04-21 RX ORDER — CARVEDILOL 3.12 MG/1
3.12 TABLET ORAL 2 TIMES DAILY
Qty: 180 TABLET | Refills: 3 | Status: SHIPPED | OUTPATIENT
Start: 2021-04-21 | End: 2021-05-19

## 2021-04-21 RX ORDER — FUROSEMIDE 40 MG/1
40 TABLET ORAL DAILY
Qty: 90 TABLET | Refills: 3 | Status: SHIPPED | OUTPATIENT
Start: 2021-04-21

## 2021-04-21 RX ORDER — POTASSIUM CHLORIDE 20 MEQ/1
20 TABLET, EXTENDED RELEASE ORAL DAILY
Qty: 90 TABLET | Refills: 3 | Status: SHIPPED | OUTPATIENT
Start: 2021-04-21

## 2021-04-21 NOTE — PROGRESS NOTES
Cc: HFpEF, severe MR, SVT    HPI:     Early Lakeisha a 80 y. o. female with a past medical history of dementia, frequent falls, hypertension, HFpEF, severe MR, SVT, code status FULL.    Echo 10/17/2020: Normal except for diastolic dysfunction stage II, moderate to severe eccentric mitral regurgitation with severe MAC.     Patient was admitted at [facility] 10/17 - 10/23/21 after she sustained a fall, possible syncope and was found to have AHF and SVT.      Patient is here for a follow up. She reports no complaints. She uses a walker. Histories     Past Medical History:   has no past medical history on file. Surgical History:   has no past surgical history on file. Social History:   reports that she has never smoked. She has never used smokeless tobacco.     Family History:  No evidence for sudden cardiac death or premature CAD      Medications:     Home medications were reviewed and are listed below    Prior to Admission medications    Medication Sig Start Date End Date Taking? Authorizing Provider   carvedilol (COREG) 3.125 MG tablet Take 1 tablet by mouth 2 times daily 4/21/21  Yes Danika Mg MD   furosemide (LASIX) 40 MG tablet Take 1 tablet by mouth daily 4/21/21  Yes Danika Mg MD   potassium chloride (KLOR-CON M) 20 MEQ extended release tablet Take 1 tablet by mouth daily 4/21/21  Yes Danika Mg MD   losartan (COZAAR) 25 MG tablet Take 1 tablet by mouth daily 10/24/20   Rico Kang MD   levothyroxine (SYNTHROID) 75 MCG tablet Take 75 mcg by mouth Daily    Historical Provider, MD   pravastatin (PRAVACHOL) 20 MG tablet Take 40 mg by mouth nightly     Historical Provider, MD   Multiple Vitamins-Minerals (THERAPEUTIC MULTIVITAMIN-MINERALS) tablet Take 1 tablet by mouth nightly     Historical Provider, MD          Allergy:     Aspirin, Food, and Penicillins       Review of Systems:     All 12 point review of symptoms completed.  Pertinent positives identified in the HPI, all other review of symptoms negative as below. CONSTITUTIONAL: No fatigue  SKIN: No rash or pruritis. EYES: No visual changes or diplopia. No scleral icterus. ENT: No Headaches, hearing loss or vertigo. No mouth sores or sore throat. CARDIOVASCULAR: No chest pain/chest pressure/chest discomfort. No palpitations. No edema. RESPIRATORY: No dyspnea. No cough or wheezing, no sputum production. GASTROINTESTINAL: No N/V/D. No abdominal pain, appetite loss, blood in stools. GENITOURINARY: No dysuria, trouble voiding, or hematuria. MUSCULOSKELETAL:  No gait disturbance, weakness or joint complaints. NEUROLOGICAL: No headache, diplopia, change in muscle strength, numbness or tingling. No change in gait, balance, coordination, mood, affect, memory, mentation, behavior. PSHYCH: No anxiety, loss of interest, change in sexual behavior, feelings of self-harm, or confusion. ENDOCRINE: No excessive thirst, fluid intake, or urination. No tremor. HEMATOLOGIC: No abnormal bruising or bleeding. ALLERGY: No nasal congestion or hives.       Physical Examination:     Vitals:    04/21/21 0949 04/21/21 0957   BP: (!) 160/100 (!) 160/86   Pulse: 76    Weight: 203 lb 9.6 oz (92.4 kg)        Wt Readings from Last 3 Encounters:   04/21/21 203 lb 9.6 oz (92.4 kg)   03/19/21 203 lb (92.1 kg)   12/14/20 204 lb 3.2 oz (92.6 kg)         General Appearance:  Alert, cooperative, no distress, appears stated age Appropriate weight   Head:  Normocephalic, without obvious abnormality, atraumatic   Eyes:  PERRL, conjunctiva/corneas clear EOM intact  Ears normal   Throat no lesions       Nose: Nares normal, no drainage or sinus tenderness   Throat: Lips, mucosa, and tongue normal   Neck: Supple, symmetrical, trachea midline, no adenopathy, thyroid: not enlarged, symmetric, no tenderness/mass/nodules, no carotid bruit       Lungs:   Clear to auscultation bilaterally, respirations unlabored   Chest Wall:  No tenderness or deformity   Heart:  Regular rhythm, rate is controlled, extra beats noted, S1, S2 normal, there is no murmur, there is no rub or gallop, no jvd, no bilateral lower extremity edema   Abdomen:   Soft, non-tender, bowel sounds active all four quadrants,  no masses, no organomegaly       Extremities: Extremities normal, atraumatic, no cyanosis   Pulses: 2+ and symmetric   Skin: Skin color, texture, turgor normal, no rashes or lesions   Pysch: Normal mood and affect   Neurologic: Normal gross motor and sensory exam.  Cranial nerves intact        Labs:     Lab Results   Component Value Date    WBC 3.4 (L) 10/19/2020    HGB 12.1 10/19/2020    HCT 36.6 10/19/2020    MCV 92.1 10/19/2020     10/19/2020     Lab Results   Component Value Date     12/22/2020    K 4.0 12/22/2020    CL 98 (L) 12/22/2020    CO2 29 12/22/2020    BUN 22 (H) 12/22/2020    CREATININE 1.0 12/22/2020    GLUCOSE 71 12/22/2020    CALCIUM 9.4 12/22/2020    PROT 7.8 10/17/2020    LABALBU 3.4 10/21/2020    BILITOT 0.5 10/17/2020    ALKPHOS 118 10/17/2020    AST 26 10/17/2020    ALT 15 10/17/2020    LABGLOM 52 (A) 12/22/2020    GFRAA >60 12/22/2020         No results found for: CHOL  No results found for: TRIG  No results found for: HDL  No results found for: LDLCHOLESTEROL, LDLCALC  No results found for: LABVLDL, VLDL  No results found for: [facility]    Lab Results   Component Value Date    INR 1.07 10/17/2020    PROTIME 12.4 10/17/2020       The ASCVD Risk score (Jorge Winchester et al., 2013) failed to calculate for the following reasons: The 2013 ASCVD risk score is only valid for ages 36 to 78      Assessment / Plan:      Diagnosis Orders   1. SVT (supraventricular tachycardia) ([facility]     2. Severe mitral regurgitation     3. Chronic heart failure with preserved ejection fraction (HFpEF) ([facility]     4. Essential hypertension          1.  Chronic HFpEF:   Patient is now euvolemic but hypertensive.      -Will start coreg 3.125 bid, will likely need a higher dose next time.   -Cw lasix 40 daily  -Low salt diet     2. SVT:   No symptoms. However, exam today reveals frequent ectopies.      -BB  -Will obtain a zio monitor x 2 weeks.      3 .Essential HTN:   BP is elevated today in the clinic     -Per #1  -Cw losartan 25 daily, starting coreg.   -Low salt diet     4. Mitral regurgitation:   LV size and function remains normal and patient is asymptomatic.      -Close monitoring.            Return in about 4 weeks (around 5/19/2021). I have spent 40 minutes of face to face time with the patient with more than 50% spent counseling and coordinating care. I have personally reviewed the reports and images of labs, radiological studies, cardiac studies including ECG's and telemetry, current and old medical records. The note was completed using EMR and Dragon dictation system. Every effort was made to ensure accuracy; however, inadvertent computerized transcription errors may be present. All questions and concerns were addressed to the patient/family. Alternatives to my treatment were discussed. I would like to thank you for providing me the opportunity to participate in the care of your patient. If you have any questions, please do not hesitate to contact me.      Estevan Barron MD, [address]  [id number] [place] [address] [facility] [id number] [place] [address] Phone: [phone]  Heart Failure Hotline: [phone]  Fax: [phone]

## 2021-05-19 ENCOUNTER — OFFICE VISIT (OUTPATIENT)
Dept: CARDIOLOGY CLINIC | Age: 86
End: 2021-05-19
Payer: MEDICARE

## 2021-05-19 VITALS
HEART RATE: 72 BPM | DIASTOLIC BLOOD PRESSURE: 70 MMHG | SYSTOLIC BLOOD PRESSURE: 150 MMHG | WEIGHT: 207.4 LBS | BODY MASS INDEX: 31.54 KG/M2

## 2021-05-19 DIAGNOSIS — I10 ESSENTIAL HYPERTENSION: ICD-10-CM

## 2021-05-19 DIAGNOSIS — I50.32 CHRONIC HEART FAILURE WITH PRESERVED EJECTION FRACTION (HFPEF) (HCC): ICD-10-CM

## 2021-05-19 DIAGNOSIS — I34.0 SEVERE MITRAL REGURGITATION: ICD-10-CM

## 2021-05-19 DIAGNOSIS — I47.1 SVT (SUPRAVENTRICULAR TACHYCARDIA) (HCC): Primary | ICD-10-CM

## 2021-05-19 PROCEDURE — 1036F TOBACCO NON-USER: CPT | Performed by: INTERNAL MEDICINE

## 2021-05-19 PROCEDURE — 4040F PNEUMOC VAC/ADMIN/RCVD: CPT | Performed by: INTERNAL MEDICINE

## 2021-05-19 PROCEDURE — 99214 OFFICE O/P EST MOD 30 MIN: CPT | Performed by: INTERNAL MEDICINE

## 2021-05-19 PROCEDURE — G8417 CALC BMI ABV UP PARAM F/U: HCPCS | Performed by: INTERNAL MEDICINE

## 2021-05-19 PROCEDURE — 1090F PRES/ABSN URINE INCON ASSESS: CPT | Performed by: INTERNAL MEDICINE

## 2021-05-19 PROCEDURE — G8428 CUR MEDS NOT DOCUMENT: HCPCS | Performed by: INTERNAL MEDICINE

## 2021-05-19 PROCEDURE — 1123F ACP DISCUSS/DSCN MKR DOCD: CPT | Performed by: INTERNAL MEDICINE

## 2021-05-19 RX ORDER — CARVEDILOL 6.25 MG/1
6.25 TABLET ORAL 2 TIMES DAILY
Qty: 180 TABLET | Refills: 3 | Status: SHIPPED | OUTPATIENT
Start: 2021-05-19

## 2021-05-19 NOTE — PROGRESS NOTES
Cc: HFpEF, severe MR, SVT    HPI:     Jabari Moya a 80 y. o. female with a past medical history of dementia, frequent falls, hypertension, HFpEF, severe MR, SVT, code status FULL.    Echo 10/17/2020: Normal except for diastolic dysfunction stage II, moderate to severe eccentric mitral regurgitation with severe MAC.     Patient was admitted at [facility] 10/17 - 10/23/21 after she sustained a fall, possible syncope and was found to have AHF and SVT.      Patient is here for a follow up. She reports no complaints. She uses a walker. Her BP is mildly elevated. Zio monitor completed but results pending. Histories     Past Medical History:   has no past medical history on file. Surgical History:   has no past surgical history on file. Social History:   reports that she has never smoked. She has never used smokeless tobacco.     Family History:  No evidence for sudden cardiac death or premature CAD      Medications:     Home medications were reviewed and are listed below    Prior to Admission medications    Medication Sig Start Date End Date Taking?  Authorizing Provider   carvedilol (COREG) 6.25 MG tablet Take 1 tablet by mouth 2 times daily 5/19/21  Yes Milena Mosqueda MD   furosemide (LASIX) 40 MG tablet Take 1 tablet by mouth daily 4/21/21  Yes Milena Mosqueda MD   potassium chloride (KLOR-CON M) 20 MEQ extended release tablet Take 1 tablet by mouth daily 4/21/21  Yes Milena Mosqueda MD   losartan (COZAAR) 25 MG tablet Take 1 tablet by mouth daily 10/24/20  Yes Gianni Patel MD   levothyroxine (SYNTHROID) 75 MCG tablet Take 75 mcg by mouth Daily   Yes Historical Provider, MD   pravastatin (PRAVACHOL) 20 MG tablet Take 40 mg by mouth nightly    Yes Historical Provider, MD   Multiple Vitamins-Minerals (THERAPEUTIC MULTIVITAMIN-MINERALS) tablet Take 1 tablet by mouth nightly    Yes Historical Provider, MD          Allergy:     Aspirin, Food, and Penicillins       Review of Systems:     All 12 point review of symptoms completed. Pertinent positives identified in the HPI, all other review of symptoms negative as below. CONSTITUTIONAL: No fatigue  SKIN: No rash or pruritis. EYES: No visual changes or diplopia. No scleral icterus. ENT: No Headaches, hearing loss or vertigo. No mouth sores or sore throat. CARDIOVASCULAR: No chest pain/chest pressure/chest discomfort. No palpitations. No edema. RESPIRATORY: No dyspnea. No cough or wheezing, no sputum production. GASTROINTESTINAL: No N/V/D. No abdominal pain, appetite loss, blood in stools. GENITOURINARY: No dysuria, trouble voiding, or hematuria. MUSCULOSKELETAL:  No gait disturbance, weakness or joint complaints. NEUROLOGICAL: No headache, diplopia, change in muscle strength, numbness or tingling. No change in gait, balance, coordination, mood, affect, memory, mentation, behavior. PSHYCH: No anxiety, loss of interest, change in sexual behavior, feelings of self-harm, or confusion. ENDOCRINE: No excessive thirst, fluid intake, or urination. No tremor. HEMATOLOGIC: No abnormal bruising or bleeding. ALLERGY: No nasal congestion or hives.       Physical Examination:     Vitals:    05/19/21 0914 05/19/21 0922   BP: (!) 150/86 (!) 150/70   Pulse: 72    Weight: 207 lb 6.4 oz (94.1 kg)        Wt Readings from Last 3 Encounters:   05/19/21 207 lb 6.4 oz (94.1 kg)   04/21/21 203 lb 9.6 oz (92.4 kg)   03/19/21 203 lb (92.1 kg)         General Appearance:  Alert, cooperative, no distress, appears stated age Appropriate weight   Head:  Normocephalic, without obvious abnormality, atraumatic   Eyes:  PERRL, conjunctiva/corneas clear EOM intact  Ears normal   Throat no lesions       Nose: Nares normal, no drainage or sinus tenderness   Throat: Lips, mucosa, and tongue normal   Neck: Supple, symmetrical, trachea midline, no adenopathy, thyroid: not enlarged, symmetric, no tenderness/mass/nodules, no carotid bruit       Lungs:   Clear to auscultation bilaterally, respirations unlabored   Chest Wall:  No tenderness or deformity   Heart:  Regular rhythm, rate is controlled, S1, S2 normal, there is no murmur, there is no rub or gallop, no jvd, no bilateral lower extremity edema   Abdomen:   Soft, non-tender, bowel sounds active all four quadrants,  no masses, no organomegaly       Extremities: Extremities normal, atraumatic, no cyanosis   Pulses: 2+ and symmetric   Skin: Skin color, texture, turgor normal, no rashes or lesions   Pysch: Normal mood and affect   Neurologic: Normal gross motor and sensory exam.  Cranial nerves intact        Labs:     Lab Results   Component Value Date    WBC 3.4 (L) 10/19/2020    HGB 12.1 10/19/2020    HCT 36.6 10/19/2020    MCV 92.1 10/19/2020     10/19/2020     Lab Results   Component Value Date     12/22/2020    K 4.0 12/22/2020    CL 98 (L) 12/22/2020    CO2 29 12/22/2020    BUN 22 (H) 12/22/2020    CREATININE 1.0 12/22/2020    GLUCOSE 71 12/22/2020    CALCIUM 9.4 12/22/2020    PROT 7.8 10/17/2020    LABALBU 3.4 10/21/2020    BILITOT 0.5 10/17/2020    ALKPHOS 118 10/17/2020    AST 26 10/17/2020    ALT 15 10/17/2020    LABGLOM 52 (A) 12/22/2020    GFRAA >60 12/22/2020         No results found for: CHOL  No results found for: TRIG  No results found for: HDL  No results found for: LDLCHOLESTEROL, LDLCALC  No results found for: LABVLDL, VLDL  No results found for: [facility]    Lab Results   Component Value Date    INR 1.07 10/17/2020    PROTIME 12.4 10/17/2020       The ASCVD Risk score (Kamari Miles et al., 2013) failed to calculate for the following reasons: The 2013 ASCVD risk score is only valid for ages 36 to 78      Assessment / Plan:      Diagnosis Orders   1. SVT (supraventricular tachycardia) ([address]     2. Severe mitral regurgitation     3. Chronic heart failure with preserved ejection fraction (HFpEF) ([address]     4. Essential hypertension          1.  Chronic HFpEF:   Patient is now euvolemic but hypertensive.      -Increase coreg to

## 2021-05-21 PROCEDURE — 93248 EXT ECG>7D<15D REV&INTERPJ: CPT | Performed by: INTERNAL MEDICINE

## 2021-05-28 DIAGNOSIS — I48.91 ATRIAL FIBRILLATION, UNSPECIFIED TYPE (HCC): ICD-10-CM

## 2021-06-25 ENCOUNTER — APPOINTMENT (OUTPATIENT)
Dept: GENERAL RADIOLOGY | Age: 86
DRG: 884 | End: 2021-06-25
Payer: MEDICARE

## 2021-06-25 ENCOUNTER — APPOINTMENT (OUTPATIENT)
Dept: CT IMAGING | Age: 86
DRG: 884 | End: 2021-06-25
Payer: MEDICARE

## 2021-06-25 ENCOUNTER — OFFICE VISIT (OUTPATIENT)
Dept: CARDIOLOGY CLINIC | Age: 86
End: 2021-06-25
Payer: MEDICARE

## 2021-06-25 ENCOUNTER — HOSPITAL ENCOUNTER (INPATIENT)
Age: 86
LOS: 3 days | Discharge: HOME | DRG: 884 | End: 2021-06-28
Attending: EMERGENCY MEDICINE | Admitting: INTERNAL MEDICINE
Payer: MEDICARE

## 2021-06-25 VITALS
HEART RATE: 60 BPM | DIASTOLIC BLOOD PRESSURE: 80 MMHG | WEIGHT: 201 LBS | SYSTOLIC BLOOD PRESSURE: 140 MMHG | BODY MASS INDEX: 30.56 KG/M2

## 2021-06-25 DIAGNOSIS — I50.32 CHRONIC HEART FAILURE WITH PRESERVED EJECTION FRACTION (HFPEF) (HCC): Primary | ICD-10-CM

## 2021-06-25 DIAGNOSIS — R41.82 ALTERED MENTAL STATUS, UNSPECIFIED ALTERED MENTAL STATUS TYPE: Primary | ICD-10-CM

## 2021-06-25 LAB
A/G RATIO: 1.1 (ref 1.1–2.2)
ALBUMIN SERPL-MCNC: 4.1 G/DL (ref 3.4–5)
ALP BLD-CCNC: 100 U/L (ref 40–129)
ALT SERPL-CCNC: 18 U/L (ref 10–40)
ANION GAP SERPL CALCULATED.3IONS-SCNC: 13 MMOL/L (ref 3–16)
AST SERPL-CCNC: 19 U/L (ref 15–37)
BASE EXCESS VENOUS: 5.5 MMOL/L (ref -2–3)
BASOPHILS ABSOLUTE: 0.1 K/UL (ref 0–0.2)
BASOPHILS RELATIVE PERCENT: 1.1 %
BILIRUB SERPL-MCNC: 0.6 MG/DL (ref 0–1)
BILIRUBIN URINE: NEGATIVE
BLOOD, URINE: NEGATIVE
BUN BLDV-MCNC: 20 MG/DL (ref 7–20)
CALCIUM SERPL-MCNC: 9.8 MG/DL (ref 8.3–10.6)
CARBOXYHEMOGLOBIN: 1.1 % (ref 0–1.5)
CHLORIDE BLD-SCNC: 98 MMOL/L (ref 99–110)
CLARITY: CLEAR
CO2: 27 MMOL/L (ref 21–32)
COLOR: YELLOW
CREAT SERPL-MCNC: 1.1 MG/DL (ref 0.6–1.2)
EOSINOPHILS ABSOLUTE: 0.2 K/UL (ref 0–0.6)
EOSINOPHILS RELATIVE PERCENT: 2.7 %
GFR AFRICAN AMERICAN: 57
GFR NON-AFRICAN AMERICAN: 47
GLOBULIN: 3.9 G/DL
GLUCOSE BLD-MCNC: 100 MG/DL (ref 70–99)
GLUCOSE URINE: NEGATIVE MG/DL
HCO3 VENOUS: 31.8 MMOL/L (ref 24–28)
HCT VFR BLD CALC: 43.1 % (ref 36–48)
HEMOGLOBIN, VEN, REDUCED: 48.6 %
HEMOGLOBIN: 14.5 G/DL (ref 12–16)
INR BLD: 1.02 (ref 0.86–1.14)
KETONES, URINE: NEGATIVE MG/DL
LACTIC ACID: 1.2 MMOL/L (ref 0.4–2)
LEUKOCYTE ESTERASE, URINE: ABNORMAL
LIPASE: 18 U/L (ref 13–60)
LYMPHOCYTES ABSOLUTE: 1.6 K/UL (ref 1–5.1)
LYMPHOCYTES RELATIVE PERCENT: 24.7 %
MCH RBC QN AUTO: 31.3 PG (ref 26–34)
MCHC RBC AUTO-ENTMCNC: 33.6 G/DL (ref 31–36)
MCV RBC AUTO: 93.3 FL (ref 80–100)
METHEMOGLOBIN VENOUS: 0.2 % (ref 0–1.5)
MICROSCOPIC EXAMINATION: YES
MONOCYTES ABSOLUTE: 0.6 K/UL (ref 0–1.3)
MONOCYTES RELATIVE PERCENT: 9.6 %
NEUTROPHILS ABSOLUTE: 4.1 K/UL (ref 1.7–7.7)
NEUTROPHILS RELATIVE PERCENT: 61.9 %
NITRITE, URINE: NEGATIVE
O2 SAT, VEN: 51 %
PCO2, VEN: 50.7 MMHG (ref 41–51)
PDW BLD-RTO: 13.5 % (ref 12.4–15.4)
PH UA: 7.5 (ref 5–8)
PH VENOUS: 7.41 (ref 7.35–7.45)
PLATELET # BLD: 284 K/UL (ref 135–450)
PMV BLD AUTO: 9.1 FL (ref 5–10.5)
PO2, VEN: <30 MMHG (ref 25–40)
POTASSIUM REFLEX MAGNESIUM: 4.2 MMOL/L (ref 3.5–5.1)
PRO-BNP: 1484 PG/ML (ref 0–449)
PROTEIN UA: NEGATIVE MG/DL
PROTHROMBIN TIME: 11.8 SEC (ref 10–13.2)
RBC # BLD: 4.62 M/UL (ref 4–5.2)
RBC UA: NORMAL /HPF (ref 0–4)
SODIUM BLD-SCNC: 138 MMOL/L (ref 136–145)
SPECIFIC GRAVITY UA: 1.01 (ref 1–1.03)
TCO2 CALC VENOUS: 33 MMOL/L
TOTAL PROTEIN: 8 G/DL (ref 6.4–8.2)
TROPONIN: 0.02 NG/ML
URINE REFLEX TO CULTURE: ABNORMAL
URINE TYPE: ABNORMAL
UROBILINOGEN, URINE: 0.2 E.U./DL
WBC # BLD: 6.6 K/UL (ref 4–11)
WBC UA: NORMAL /HPF (ref 0–5)

## 2021-06-25 PROCEDURE — 99214 OFFICE O/P EST MOD 30 MIN: CPT | Performed by: INTERNAL MEDICINE

## 2021-06-25 PROCEDURE — 81001 URINALYSIS AUTO W/SCOPE: CPT

## 2021-06-25 PROCEDURE — 83690 ASSAY OF LIPASE: CPT

## 2021-06-25 PROCEDURE — 84439 ASSAY OF FREE THYROXINE: CPT

## 2021-06-25 PROCEDURE — 80053 COMPREHEN METABOLIC PANEL: CPT

## 2021-06-25 PROCEDURE — 84484 ASSAY OF TROPONIN QUANT: CPT

## 2021-06-25 PROCEDURE — 83605 ASSAY OF LACTIC ACID: CPT

## 2021-06-25 PROCEDURE — 2580000003 HC RX 258: Performed by: INTERNAL MEDICINE

## 2021-06-25 PROCEDURE — 4040F PNEUMOC VAC/ADMIN/RCVD: CPT | Performed by: INTERNAL MEDICINE

## 2021-06-25 PROCEDURE — 87086 URINE CULTURE/COLONY COUNT: CPT

## 2021-06-25 PROCEDURE — 84443 ASSAY THYROID STIM HORMONE: CPT

## 2021-06-25 PROCEDURE — 83880 ASSAY OF NATRIURETIC PEPTIDE: CPT

## 2021-06-25 PROCEDURE — 99283 EMERGENCY DEPT VISIT LOW MDM: CPT

## 2021-06-25 PROCEDURE — 82803 BLOOD GASES ANY COMBINATION: CPT

## 2021-06-25 PROCEDURE — 85025 COMPLETE CBC W/AUTO DIFF WBC: CPT

## 2021-06-25 PROCEDURE — 1036F TOBACCO NON-USER: CPT | Performed by: INTERNAL MEDICINE

## 2021-06-25 PROCEDURE — G8417 CALC BMI ABV UP PARAM F/U: HCPCS | Performed by: INTERNAL MEDICINE

## 2021-06-25 PROCEDURE — 70450 CT HEAD/BRAIN W/O DYE: CPT

## 2021-06-25 PROCEDURE — 1123F ACP DISCUSS/DSCN MKR DOCD: CPT | Performed by: INTERNAL MEDICINE

## 2021-06-25 PROCEDURE — G8427 DOCREV CUR MEDS BY ELIG CLIN: HCPCS | Performed by: INTERNAL MEDICINE

## 2021-06-25 PROCEDURE — 2060000000 HC ICU INTERMEDIATE R&B

## 2021-06-25 PROCEDURE — 85610 PROTHROMBIN TIME: CPT

## 2021-06-25 PROCEDURE — 71046 X-RAY EXAM CHEST 2 VIEWS: CPT

## 2021-06-25 PROCEDURE — 1090F PRES/ABSN URINE INCON ASSESS: CPT | Performed by: INTERNAL MEDICINE

## 2021-06-25 PROCEDURE — 6360000002 HC RX W HCPCS: Performed by: INTERNAL MEDICINE

## 2021-06-25 RX ORDER — FUROSEMIDE 40 MG/1
40 TABLET ORAL DAILY
Status: DISCONTINUED | OUTPATIENT
Start: 2021-06-26 | End: 2021-06-28 | Stop reason: HOSPADM

## 2021-06-25 RX ORDER — LOSARTAN POTASSIUM 25 MG/1
25 TABLET ORAL DAILY
Status: DISCONTINUED | OUTPATIENT
Start: 2021-06-26 | End: 2021-06-28 | Stop reason: HOSPADM

## 2021-06-25 RX ORDER — ASPIRIN 81 MG/1
81 TABLET ORAL DAILY
Qty: 90 TABLET | Refills: 1
Start: 2021-06-25

## 2021-06-25 RX ORDER — PRAVASTATIN SODIUM 40 MG
40 TABLET ORAL NIGHTLY
Status: DISCONTINUED | OUTPATIENT
Start: 2021-06-25 | End: 2021-06-28 | Stop reason: HOSPADM

## 2021-06-25 RX ORDER — ACETAMINOPHEN 650 MG/1
650 SUPPOSITORY RECTAL EVERY 6 HOURS PRN
Status: DISCONTINUED | OUTPATIENT
Start: 2021-06-25 | End: 2021-06-28 | Stop reason: HOSPADM

## 2021-06-25 RX ORDER — SODIUM CHLORIDE 0.9 % (FLUSH) 0.9 %
5-40 SYRINGE (ML) INJECTION PRN
Status: DISCONTINUED | OUTPATIENT
Start: 2021-06-25 | End: 2021-06-28 | Stop reason: HOSPADM

## 2021-06-25 RX ORDER — ACETAMINOPHEN 325 MG/1
650 TABLET ORAL EVERY 6 HOURS PRN
Status: DISCONTINUED | OUTPATIENT
Start: 2021-06-25 | End: 2021-06-28 | Stop reason: HOSPADM

## 2021-06-25 RX ORDER — CARVEDILOL 6.25 MG/1
6.25 TABLET ORAL 2 TIMES DAILY
Status: DISCONTINUED | OUTPATIENT
Start: 2021-06-25 | End: 2021-06-28 | Stop reason: HOSPADM

## 2021-06-25 RX ORDER — POLYETHYLENE GLYCOL 3350 17 G/17G
17 POWDER, FOR SOLUTION ORAL DAILY PRN
Status: DISCONTINUED | OUTPATIENT
Start: 2021-06-25 | End: 2021-06-28 | Stop reason: HOSPADM

## 2021-06-25 RX ORDER — SODIUM CHLORIDE 0.9 % (FLUSH) 0.9 %
5-40 SYRINGE (ML) INJECTION EVERY 12 HOURS SCHEDULED
Status: DISCONTINUED | OUTPATIENT
Start: 2021-06-25 | End: 2021-06-28 | Stop reason: HOSPADM

## 2021-06-25 RX ORDER — ONDANSETRON 4 MG/1
4 TABLET, ORALLY DISINTEGRATING ORAL EVERY 8 HOURS PRN
Status: DISCONTINUED | OUTPATIENT
Start: 2021-06-25 | End: 2021-06-28 | Stop reason: HOSPADM

## 2021-06-25 RX ORDER — SODIUM CHLORIDE 9 MG/ML
25 INJECTION, SOLUTION INTRAVENOUS PRN
Status: DISCONTINUED | OUTPATIENT
Start: 2021-06-25 | End: 2021-06-28 | Stop reason: HOSPADM

## 2021-06-25 RX ORDER — LEVOTHYROXINE SODIUM 0.07 MG/1
75 TABLET ORAL DAILY
Status: DISCONTINUED | OUTPATIENT
Start: 2021-06-26 | End: 2021-06-28 | Stop reason: HOSPADM

## 2021-06-25 RX ORDER — ONDANSETRON 2 MG/ML
4 INJECTION INTRAMUSCULAR; INTRAVENOUS EVERY 6 HOURS PRN
Status: DISCONTINUED | OUTPATIENT
Start: 2021-06-25 | End: 2021-06-28 | Stop reason: HOSPADM

## 2021-06-25 RX ADMIN — ENOXAPARIN SODIUM 40 MG: 40 INJECTION SUBCUTANEOUS at 21:56

## 2021-06-25 RX ADMIN — Medication 10 ML: at 21:55

## 2021-06-25 NOTE — PROGRESS NOTES
Cc: HFpEF, severe MR, SVT    HPI:     Elvira Romero is a [id number]. o. female with a past medical history of dementia, frequent falls, hypertension, HFpEF, severe MR, SVT, code status FULL.    Echo 10/17/2020: Normal except for diastolic dysfunction stage II, moderate to severe eccentric mitral regurgitation with severe MAC. ZIO monitor 4/2155/5/2021: NSR at baseline, brief episodes of SVT, likely AF with RVR, 1 episode of NSVT 8 beats. Patient returns to the clinic today with her daughter for follow-up. She reports no complaints. Her BP has improved after increasing her Coreg last time. Histories     Past Medical History:   has a past medical history of Dementia ([address] Surgical History:   has no past surgical history on file. Social History:   reports that she has never smoked. She has never used smokeless tobacco.     Family History:  No evidence for sudden cardiac death or premature CAD      Medications:     Home medications were reviewed and are listed below    Prior to Admission medications    Medication Sig Start Date End Date Taking?  Authorizing Provider   aspirin EC 81 MG EC tablet Take 1 tablet by mouth daily 6/25/21  Yes Jason Foreman MD   carvedilol (COREG) 6.25 MG tablet Take 1 tablet by mouth 2 times daily 5/19/21  Yes Jason Foreman MD   furosemide (LASIX) 40 MG tablet Take 1 tablet by mouth daily 4/21/21  Yes Jason Foreman MD   potassium chloride (KLOR-CON M) 20 MEQ extended release tablet Take 1 tablet by mouth daily 4/21/21  Yes Jason Foreman MD   losartan (COZAAR) 25 MG tablet Take 1 tablet by mouth daily 10/24/20  Yes Lesvia Portillo MD   levothyroxine (SYNTHROID) 75 MCG tablet Take 75 mcg by mouth Daily   Yes Historical Provider, MD   pravastatin (PRAVACHOL) 20 MG tablet Take 40 mg by mouth nightly    Yes Historical Provider, MD   Multiple Vitamins-Minerals (THERAPEUTIC MULTIVITAMIN-MINERALS) tablet Take 1 tablet by mouth nightly    Yes Historical Provider, MD Allergy:     Aspirin, Food, and Penicillins       Review of Systems:     All 12 point review of symptoms completed. Pertinent positives identified in the HPI, all other review of symptoms negative as below. CONSTITUTIONAL: No fatigue  SKIN: No rash or pruritis. EYES: No visual changes or diplopia. No scleral icterus. ENT: No Headaches, hearing loss or vertigo. No mouth sores or sore throat. CARDIOVASCULAR: No chest pain/chest pressure/chest discomfort. No palpitations. No edema. RESPIRATORY: No dyspnea. No cough or wheezing, no sputum production. GASTROINTESTINAL: No N/V/D. No abdominal pain, appetite loss, blood in stools. GENITOURINARY: No dysuria, trouble voiding, or hematuria. MUSCULOSKELETAL:  No gait disturbance, weakness or joint complaints. NEUROLOGICAL: No headache, diplopia, change in muscle strength, numbness or tingling. No change in gait, balance, coordination, mood, affect, memory, mentation, behavior. PSHYCH: No anxiety, loss of interest, change in sexual behavior, feelings of self-harm, or confusion. ENDOCRINE: No excessive thirst, fluid intake, or urination. No tremor. HEMATOLOGIC: No abnormal bruising or bleeding. ALLERGY: No nasal congestion or hives.       Physical Examination:     Vitals:    06/25/21 1501 06/25/21 1504   BP: (!) 140/80 (!) 140/80   Pulse: 60    Weight: 201 lb (91.2 kg)        Wt Readings from Last 3 Encounters:   06/25/21 203 lb (92.1 kg)   06/25/21 201 lb (91.2 kg)   05/19/21 207 lb 6.4 oz (94.1 kg)         General Appearance:  Alert, cooperative, no distress, appears stated age Appropriate weight   Head:  Normocephalic, without obvious abnormality, atraumatic   Eyes:  PERRL, conjunctiva/corneas clear EOM intact  Ears normal   Throat no lesions       Nose: Nares normal, no drainage or sinus tenderness   Throat: Lips, mucosa, and tongue normal   Neck: Supple, symmetrical, trachea midline, no adenopathy, thyroid: not enlarged, symmetric, no tenderness/mass/nodules, no carotid bruit       Lungs:   Clear to auscultation bilaterally, respirations unlabored   Chest Wall:  No tenderness or deformity   Heart:  Regular rhythm, rate is controlled, S1, S2 normal, there is no murmur, there is no rub or gallop, no jvd, no bilateral lower extremity edema   Abdomen:   Soft, non-tender, bowel sounds active all four quadrants,  no masses, no organomegaly       Extremities: Extremities normal, atraumatic, no cyanosis   Pulses: 2+ and symmetric   Skin: Skin color, texture, turgor normal, no rashes or lesions   Pysch: Normal mood and affect   Neurologic: Normal gross motor and sensory exam.  Cranial nerves intact        Labs:     Lab Results   Component Value Date    WBC 3.4 (L) 10/19/2020    HGB 12.1 10/19/2020    HCT 36.6 10/19/2020    MCV 92.1 10/19/2020     10/19/2020     Lab Results   Component Value Date     12/22/2020    K 4.0 12/22/2020    CL 98 (L) 12/22/2020    CO2 29 12/22/2020    BUN 22 (H) 12/22/2020    CREATININE 1.0 12/22/2020    GLUCOSE 71 12/22/2020    CALCIUM 9.4 12/22/2020    PROT 7.8 10/17/2020    LABALBU 3.4 10/21/2020    BILITOT 0.5 10/17/2020    ALKPHOS 118 10/17/2020    AST 26 10/17/2020    ALT 15 10/17/2020    LABGLOM 52 (A) 12/22/2020    GFRAA >60 12/22/2020         No results found for: CHOL  No results found for: TRIG  No results found for: HDL  No results found for: LDLCHOLESTEROL, LDLCALC  No results found for: LABVLDL, VLDL  No results found for: [facility]    Lab Results   Component Value Date    INR 1.07 10/17/2020    PROTIME 12.4 10/17/2020       The ASCVD Risk score (Tomas Arreola et al., 2013) failed to calculate for the following reasons: The 2013 ASCVD risk score is only valid for ages 36 to 78      Assessment / Plan:      Diagnosis Orders   1. Chronic heart failure with preserved ejection fraction (HFpEF) ([facility] [address]          1. Chronic HFpEF:   Patient is now euvolemic and hemodynamically stable. .      -Cw lasix 40 daily  -Low salt diet, control BP     2. SVT, likely PAF with RVR:   No symptoms.      Continue with beta-blocker and will start baby aspirin (fall risk).     3 .Essential HTN:   BP has improved with antihypertensive medications.    -Cw losartan 25 daily, coreg 6.25 twice daily.   -Low salt diet     4. Mitral regurgitation:   LV size and function remains normal and patient is asymptomatic.      -Close monitoring.         Return in about 6 months (around 12/25/2021). I have spent 35 minutes of face to face time with the patient with more than 50% spent counseling and coordinating care. I have personally reviewed the reports and images of labs, radiological studies, cardiac studies including ECG's and telemetry, current and old medical records. The note was completed using EMR and Dragon dictation system. Every effort was made to ensure accuracy; however, inadvertent computerized transcription errors may be present. All questions and concerns were addressed to the patient/family. Alternatives to my treatment were discussed. I would like to thank you for providing me the opportunity to participate in the care of your patient. If you have any questions, please do not hesitate to contact me.      Landon Silva MD, [place] [address] Phone: [phone]  Heart Failure Hotline: [phone]  Fax: [phone]

## 2021-06-25 NOTE — ED NOTES
Bed: A05-05  Expected date:   Expected time:   Means of arrival:   Comments:  Ernesto Light RN  06/25/21 [id number]

## 2021-06-25 NOTE — ED PROVIDER NOTES
ED Attending Attestation Note     Date of evaluation: 6/25/2021    This patient was seen by the resident. I have seen and examined the patient, agree with the workup, evaluation, management and diagnosis. The care plan has been discussed. I have reviewed the ECG and concur with the resident's interpretation. My assessment reveals awake and alert with calm affect. No complaints currently. Family reports change in behavior recently to include hallucinations and spreading feces around.      Kimberly Lunsford MD  06/25/21 9525

## 2021-06-25 NOTE — ED PROVIDER NOTES
[id number] RESIDENT NOTE       Date of evaluation: 6/25/2021    Chief Complaint     Other (daughter states pt has dementia and it is getting worse and she can't take her back home)      History of Present Illness     Jose Luna is a 80 y.o. female who presents with altered mental status    History is limited by patient condition: Dementia. Patient has no complaints, and specifically denies any pain anywhere, headache, difficulty breathing. Colleen Arizmendi (Daughter) [phone]  Ms. García Nova provides the following history:     Ms. García Nova states that the patient lives at home with her. Over the last 2 weeks, the patient has had progressive change in behavior. She has begun spreading feces around the house, talking to people who have been dead for many years as if they are in the room. Ms. García Nova states that the patient has had no complaints except for intermittently complaining of bilateral hand pain. The symptoms wax and wane throughout the day without a clear trigger. There is been no fever, nausea, vomiting. The patient is began drinking well. There is been no change in her bowel or bladder habits, and spreading of feces is not related to an acute change in the content of her stools. Of note, she states that she is the POA. She states that the patient remains a full code, although she would not want prolonged attempts but would want at least \"2 rounds\"    Review of Systems     Review of Systems     History is limited by patient condition: Dementia. Past Medical, Surgical, Family, and Social History     She has a past medical history of Dementia ([facility] [address] She has no past surgical history on file. Her family history is not on file. She reports that she has never smoked.  She has never used smokeless tobacco.    Medications     Previous Medications    ASPIRIN EC 81 MG EC TABLET    Take 1 tablet by mouth daily    CARVEDILOL (COREG) 6.25 MG TABLET Take 1 tablet by mouth 2 times daily    FUROSEMIDE (LASIX) 40 MG TABLET    Take 1 tablet by mouth daily    LEVOTHYROXINE (SYNTHROID) 75 MCG TABLET    Take 75 mcg by mouth Daily    LOSARTAN (COZAAR) 25 MG TABLET    Take 1 tablet by mouth daily    MULTIPLE VITAMINS-MINERALS (THERAPEUTIC MULTIVITAMIN-MINERALS) TABLET    Take 1 tablet by mouth nightly     POTASSIUM CHLORIDE (KLOR-CON M) 20 MEQ EXTENDED RELEASE TABLET    Take 1 tablet by mouth daily    PRAVASTATIN (PRAVACHOL) 20 MG TABLET    Take 40 mg by mouth nightly        Allergies     She is allergic to aspirin, food, and penicillins. Physical Exam     INITIAL VITALS: BP: (!) 180/97, Temp: 97.8 °F (36.6 °C), Pulse: 78, Resp: 18, SpO2: 96 %   Physical Exam       Vitals:    06/25/21 1627 06/25/21 1712   BP: (!) 180/97 (!) 179/84   Pulse: 78    Resp: 18    Temp: 97.8 °F (36.6 °C)    TempSrc: Oral    SpO2: 96%    Weight: 203 lb (92.1 kg)    Height: 5' 5\" (1.651 m)        General:  Well appearing. No acute distress. Non-toxic appearing    Eyes:  Pupils equally round, reactive, brisk. No discharge from eyes. ENT:  No discharge from nose. OP clear. Neck:  Supple. Nontender. Pulmonary:   Non-labored breathing. Breath sounds clear bilaterally. Cardiac:  Regular rate and rhythm. No murmurs. Abdomen:  Soft. Non-tender. Non-distended. No masses. Musculoskeletal:  No long bone deformity. No ankle or wrist deformity. Vascular:  Extremities warm and perfused. Radial pulses 2+ bilaterally. Dorsalis pedis pulses 2+ bilaterally. Skin:  No rash. Warm. Neuro: Alert and oriented x 3. CN II-XII grossly intact. Speech and mentation normal.    No pronator drift. LE at least anti-gravity for hip flexion x5 secs b/l.  5/5 strength in all 4 extremities by finger  and ankle dorsi/plantar flexion. Sensation grossly intact to light touch. Gait narrow and stable not ataxic (2 steps taken at bedside with assist)    Extremities:  No peripheral edema.  LE symmetric. DiagnosticResults     EKG   Interpreted in conjunction with emergencydepartment physician Molly*  Rhythm: normal sinus   Rate: 80  Axis: left (-1)  Ectopy: none  Conduction: normal  ST Segments: no acute change  T Waves:no acute change  Q Waves: nonspecific  Clinical Impression: no acute changes and non-specific EKG  Comparison:  10/18/2020    RADIOLOGY:  CT HEAD WO CONTRAST   Final Result      Chronic atrophic changes of the brain without acute hemorrhage, edema or significant change. Low attenuation the periventricular white matter and symmetric ventricular dilation are stable. No midline shift or hydrocephalus. Prior cataract surgery noted. No subdural or epidural hematoma. XR CHEST (2 VW)   Final Result   1. No acute cardiopulmonary abnormality. 2. Large hiatal hernia. 3. Severe kyphoscoliotic curvature the spine.           LABS:   Results for orders placed or performed during the hospital encounter of 06/25/21   CBC Auto Differential   Result Value Ref Range    WBC 6.6 4.0 - 11.0 K/uL    RBC 4.62 4.00 - 5.20 M/uL    Hemoglobin 14.5 12.0 - 16.0 g/dL    Hematocrit 43.1 36.0 - 48.0 %    MCV 93.3 80.0 - 100.0 fL    MCH 31.3 26.0 - 34.0 pg    MCHC 33.6 31.0 - 36.0 g/dL    RDW 13.5 12.4 - 15.4 %    Platelets 132 821 - 243 K/uL    MPV 9.1 5.0 - 10.5 fL    Neutrophils % 61.9 %    Lymphocytes % 24.7 %    Monocytes % 9.6 %    Eosinophils % 2.7 %    Basophils % 1.1 %    Neutrophils Absolute 4.1 1.7 - 7.7 K/uL    Lymphocytes Absolute 1.6 1.0 - 5.1 K/uL    Monocytes Absolute 0.6 0.0 - 1.3 K/uL    Eosinophils Absolute 0.2 0.0 - 0.6 K/uL    Basophils Absolute 0.1 0.0 - 0.2 K/uL   Comprehensive Metabolic Panel w/ Reflex to MG   Result Value Ref Range    Sodium 138 136 - 145 mmol/L    Potassium reflex Magnesium 4.2 3.5 - 5.1 mmol/L    Chloride 98 (L) 99 - 110 mmol/L    CO2 27 21 - 32 mmol/L    Anion Gap 13 3 - 16    Glucose 100 (H) 70 - 99 mg/dL    BUN 20 7 - 20 mg/dL CREATININE 1.1 0.6 - 1.2 mg/dL    GFR Non- 47 (A) >60    GFR  57 (A) >60    Calcium 9.8 8.3 - 10.6 mg/dL    Total Protein 8.0 6.4 - 8.2 g/dL    Albumin 4.1 3.4 - 5.0 g/dL    Albumin/Globulin Ratio 1.1 1.1 - 2.2    Total Bilirubin 0.6 0.0 - 1.0 mg/dL    Alkaline Phosphatase 100 40 - 129 U/L    ALT 18 10 - 40 U/L    AST 19 15 - 37 U/L    Globulin 3.9 g/dL   Lipase   Result Value Ref Range    Lipase 18.0 13.0 - 60.0 U/L   Troponin   Result Value Ref Range    Troponin 0.02 (H) <0.01 ng/mL   Brain Natriuretic Peptide   Result Value Ref Range    Pro-BNP 1,484 (H) 0 - 449 pg/mL   Protime-INR   Result Value Ref Range    Protime 11.8 10.0 - 13.2 sec    INR 1.02 0.86 - 1.14   Blood Gas, Venous   Result Value Ref Range    pH, Ruddy 7.406 7.350 - 7.450    pCO2, Ruddy 50.7 41.0 - 51.0 mmHg    pO2, Ruddy <30.0 25 - 40 mmHg    HCO3, Venous 31.8 (H) 24.0 - 28.0 mmol/L    Base Excess, Ruddy 5.5 (H) -2.0 - 3.0 mmol/L    O2 Sat, Ruddy 51 Not established %    Carboxyhemoglobin 1.1 0.0 - 1.5 %    MetHgb, Ruddy 0.2 0.0 - 1.5 %    TC02 (Calc), Ruddy 33 mmol/L    Hemoglobin, Ruddy, Reduced 48.60 %   Urinalysis Reflex to Culture    Specimen: Urine, clean catch   Result Value Ref Range    Color, UA Yellow Straw/Yellow    Clarity, UA Clear Clear    Glucose, Ur Negative Negative mg/dL    Bilirubin Urine Negative Negative    Ketones, Urine Negative Negative mg/dL    Specific Gravity, UA 1.015 1.005 - 1.030    Blood, Urine Negative Negative    pH, UA 7.5 5.0 - 8.0    Protein, UA Negative Negative mg/dL    Urobilinogen, Urine 0.2 <2.0 E.U./dL    Nitrite, Urine Negative Negative    Leukocyte Esterase, Urine LARGE (A) Negative    Microscopic Examination YES     Urine Type NotGiven     Urine Reflex to Culture Not Indicated    Lactic Acid, Plasma   Result Value Ref Range    Lactic Acid 1.2 0.4 - 2.0 mmol/L   Microscopic Urinalysis   Result Value Ref Range    WBC, UA 3-5 0 - 5 /HPF    RBC, UA 0-2 0 - 4 /HPF       ED BEDSIDE ULTRASOUND:  None    RECENT VITALS:  BP: (!) 179/84, Temp: 97.8 °F (36.6 °C), Pulse: 78,Resp: 18, SpO2: 96 %     Procedures     none    ED Course     Nursing Notes, Past Medical Hx, Past Surgical Hx, Social Hx, Allergies, and Family Hx were reviewed. The patient was given the followingmedications:  No orders of the defined types were placed in this encounter. CONSULTS:  Winter Nieves / ASSESSMENT / Tania Awais is a 80 y.o. female with hx dementia, falls who presents with altered mental status. This patient was also evaluated by the attending physician. All care plans were discussed and agreed upon. This patient proceeded with altered mental status.   Immediate evaluation notable for:   -McGehee prehospital stroke scale was negative    Evaluation considered multiple possible etiologies:  Infectious:   -Patient was well appearing without signs on exam or specific referent symptoms to suggestion infection  -I had a low suspicion for meningitis or encephalitis given the patient was not  febrile, complaining of neck pain, or demonstrating suggestive signs on exam  -Urinalysis  negative for evidence of infection; UCx sent  -Chest xray negative for evidence of infection  -WBC not elevated and therefore reassuring against infection  -Blood cultures x2 were not sent given very low suspicion for bacteremia  -Lactate not elevated reassuring against infection  Metabolic/toxic:  -Basic metabolic panel  with no evidence of electrolyte derangement or significant kidney injury   -Hepatic function panel with no evidence of significant hepatic disfunction  -Thyroid studies were obtained and pending  -Patient denied ingestion or substance use, and examination did not suggest these etiologies  -Review of medication list did not identify any likely culprit medications  Neurologic/neurovascular  -The history and examination were not suggestive of cerebrovascular accident, TIA, or other neurovascular process such as intracranial hemorrhage. Of note, her neurologic exam was nonfocal.  -CT head without contrast obtained given hx falls and concern for chronic subdural and without acute findings to explain patient's presentation  -History did not suggest a seizure disorder, and patient does not have history of epilepsy  Cardiac/Pulmonary  -EKG unremarkable, as above  -CXR without acute findings to explain patient's presentation  -VBG unremarkable  -Troponin mildly elevated, most consistent given overall clinical picture with a type II demand non-ST elevation myocardial infarction; repeat troponin was pending  -BNP elevated consistent but in overall context low suspicion for a component of heart failure exacerbation    Given the above evaluation, the cause of the patient's altered mental status remains elusive. The patient is not back to baseline and will be admitted to medicine for further evaluation of altered mental status. Clinical Impression     1. Altered mental status, unspecified altered mental status type        Disposition     PATIENT REFERRED TO:  No follow-up provider specified.     DISCHARGE MEDICATIONS:  New Prescriptions    No medications on file       DISPOSITION    Decision to Oskar oMbley to hospital     Jesus Herron MD  06/25/21 [id number]

## 2021-06-26 LAB
ANION GAP SERPL CALCULATED.3IONS-SCNC: 11 MMOL/L (ref 3–16)
BUN BLDV-MCNC: 17 MG/DL (ref 7–20)
CALCIUM SERPL-MCNC: 9.3 MG/DL (ref 8.3–10.6)
CHLORIDE BLD-SCNC: 102 MMOL/L (ref 99–110)
CO2: 26 MMOL/L (ref 21–32)
CREAT SERPL-MCNC: 0.9 MG/DL (ref 0.6–1.2)
GFR AFRICAN AMERICAN: >60
GFR NON-AFRICAN AMERICAN: 59
GLUCOSE BLD-MCNC: 89 MG/DL (ref 70–99)
GLUCOSE BLD-MCNC: 94 MG/DL (ref 70–99)
HCT VFR BLD CALC: 37.3 % (ref 36–48)
HEMOGLOBIN: 12.7 G/DL (ref 12–16)
MCH RBC QN AUTO: 31.3 PG (ref 26–34)
MCHC RBC AUTO-ENTMCNC: 34 G/DL (ref 31–36)
MCV RBC AUTO: 92.2 FL (ref 80–100)
PDW BLD-RTO: 13.6 % (ref 12.4–15.4)
PERFORMED ON: NORMAL
PLATELET # BLD: 242 K/UL (ref 135–450)
PMV BLD AUTO: 9.1 FL (ref 5–10.5)
POTASSIUM REFLEX MAGNESIUM: 3.9 MMOL/L (ref 3.5–5.1)
RBC # BLD: 4.05 M/UL (ref 4–5.2)
SODIUM BLD-SCNC: 139 MMOL/L (ref 136–145)
T4 FREE: 1.7 NG/DL (ref 0.9–1.8)
TROPONIN: 0.04 NG/ML
TROPONIN: 0.05 NG/ML
TROPONIN: 0.06 NG/ML
TSH SERPL DL<=0.05 MIU/L-ACNC: 3.37 UIU/ML (ref 0.27–4.2)
URINE CULTURE, ROUTINE: NORMAL
VITAMIN B-12: 455 PG/ML (ref 211–911)
WBC # BLD: 5.7 K/UL (ref 4–11)

## 2021-06-26 PROCEDURE — 36415 COLL VENOUS BLD VENIPUNCTURE: CPT

## 2021-06-26 PROCEDURE — 80048 BASIC METABOLIC PNL TOTAL CA: CPT

## 2021-06-26 PROCEDURE — 85027 COMPLETE CBC AUTOMATED: CPT

## 2021-06-26 PROCEDURE — 84484 ASSAY OF TROPONIN QUANT: CPT

## 2021-06-26 PROCEDURE — 2060000000 HC ICU INTERMEDIATE R&B

## 2021-06-26 PROCEDURE — 92610 EVALUATE SWALLOWING FUNCTION: CPT

## 2021-06-26 PROCEDURE — 82607 VITAMIN B-12: CPT

## 2021-06-26 PROCEDURE — 6360000002 HC RX W HCPCS: Performed by: INTERNAL MEDICINE

## 2021-06-26 PROCEDURE — 6370000000 HC RX 637 (ALT 250 FOR IP): Performed by: INTERNAL MEDICINE

## 2021-06-26 PROCEDURE — 2580000003 HC RX 258: Performed by: INTERNAL MEDICINE

## 2021-06-26 RX ADMIN — Medication 10 ML: at 08:22

## 2021-06-26 RX ADMIN — Medication 10 ML: at 20:20

## 2021-06-26 RX ADMIN — CARVEDILOL 6.25 MG: 6.25 TABLET, FILM COATED ORAL at 08:20

## 2021-06-26 RX ADMIN — CARVEDILOL 6.25 MG: 6.25 TABLET, FILM COATED ORAL at 20:14

## 2021-06-26 RX ADMIN — PRAVASTATIN SODIUM 40 MG: 40 TABLET ORAL at 20:14

## 2021-06-26 RX ADMIN — FUROSEMIDE 40 MG: 40 TABLET ORAL at 08:20

## 2021-06-26 RX ADMIN — LOSARTAN POTASSIUM 25 MG: 25 TABLET, FILM COATED ORAL at 08:20

## 2021-06-26 RX ADMIN — LEVOTHYROXINE SODIUM 75 MCG: 0.07 TABLET ORAL at 08:25

## 2021-06-26 RX ADMIN — ENOXAPARIN SODIUM 40 MG: 40 INJECTION SUBCUTANEOUS at 08:20

## 2021-06-26 ASSESSMENT — PAIN SCALES - GENERAL
PAINLEVEL_OUTOF10: 0

## 2021-06-26 NOTE — PROGRESS NOTES
4 Eyes Admission Assessment     I agree as the admission nurse that 2 RN's have performed a thorough Head to Toe Skin Assessment on the patient. ALL assessment sites listed below have been assessed on admission. Areas assessed by both nurses:   [x]   Head, Face, and Ears   [x]   Shoulders, Back, and Chest  [x]   Arms, Elbows, and Hands   [x]   Coccyx, Sacrum, and Ischium  [x]   Legs, Feet, and Heels        Does the Patient have Skin Breakdown?   No         Brendan Prevention initiated:  No   Wound Care Orders initiated:  No      [facility] nurse consulted for Pressure Injury (Stage 3,4, Unstageable, DTI, NWPT, and Complex wounds) or Brendan score 18 or lower:  No      Nurse 1 eSignature: Electronically signed by Zoltan Topete RN on 6/25/21 at 8:54 PM EDT    **SHARE this note so that the co-signing nurse is able to place an eSignature**    Nurse 2 eSignature: {Esignature:[id number]}

## 2021-06-26 NOTE — H&P
Hospital Medicine History & Physical      PCP: Jeremias Malone    Date of Admission: 6/25/2021    Date of Service: Pt seen/examined on    and Admitted to Inpatient with expected LOS greater than two midnights due to medical therapy. Chief Complaint: Fall at home      History Of Present Illness:      80 y.o. female for the past medical history of advanced dementia, hypertension, heart failure preserved ejection fraction, severe MR, SVT who is brought in by daughter for mental status changes going on for the last 3 weeks. History obtained from ER provider as well as daughter. As per daughter patient has advanced dementia but for the last 1 month she has acute mental status changes like she is throwing poop on the floor , smearing shower,  rosales with feces. Daughter called PCP who suggested to come to the ER. Daughter appeared distressed. Patient has an appointment with Dr. Alvin Don, daughter brought her to the ER from cardiology office to save the  trip. She is denying any fever no chills, no sick contacts for the patient  At the time of my examination patient is pleasantly confused, no complaint no chest pain no difficulty breathing she is alert and oriented only to self. ED work-up:-CT head unremarkable, chest x ray  Unremarkable, elevated troponin II 0.02 but no ischemia on EKG, urine with leuk esterases    Patient is a poor historian due to dementia. Denies any pain at this time. Denies fever, chills, cough, chest or abdominal pain, nausea, vomiting, diarrhea or constipation, urinary symptoms    Past Medical History:          Diagnosis Date    Dementia [facility])      Past medical history is not available and patient is not able to provide reliable history. Past Surgical History:      History reviewed. No pertinent surgical history. Unable to obtain from the patient due to baseline dementia.     Medications Prior to Admission:      Prior to Admission medications    Medication Sig Start Date End Date Taking? Authorizing Provider   aspirin EC 81 MG EC tablet Take 1 tablet by mouth daily 6/25/21   George Melo MD   carvedilol (COREG) 6.25 MG tablet Take 1 tablet by mouth 2 times daily 5/19/21   George Melo MD   furosemide (LASIX) 40 MG tablet Take 1 tablet by mouth daily 4/21/21   George Melo MD   potassium chloride (KLOR-CON M) 20 MEQ extended release tablet Take 1 tablet by mouth daily 4/21/21   George Melo MD   losartan (COZAAR) 25 MG tablet Take 1 tablet by mouth daily 10/24/20   Kalpana Lira MD   levothyroxine (SYNTHROID) 75 MCG tablet Take 75 mcg by mouth Daily    Historical Provider, MD   pravastatin (PRAVACHOL) 20 MG tablet Take 40 mg by mouth nightly     Historical Provider, MD   Multiple Vitamins-Minerals (THERAPEUTIC MULTIVITAMIN-MINERALS) tablet Take 1 tablet by mouth nightly     Historical Provider, MD   Need to verify from her pharmacy in the morning    Allergies:  Aspirin, Food, and Penicillins    Social History:    TOBACCO:   reports that she has never smoked. She has never used smokeless tobacco.  ETOH:   has no history on file for alcohol use. E-Cigarettes/Vaping Use     Questions Responses    E-Cigarette/Vaping Use     Start Date     Passive Exposure     Quit Date     Counseling Given     Comments         Family History:    Reviewed in detail and negative for DM, CAD, Cancer, CVA. Positive as follows:    History reviewed. No pertinent family history. REVIEW OF SYSTEMS:   Patient is unable to provide reliable history due to underlying dementia    PHYSICAL EXAM PERFORMED:    BP (!) 175/83   Pulse 72   Temp 97.8 °F (36.6 °C) (Oral)   Resp 18   Ht 5' 5\" (1.651 m)   Wt 203 lb (92.1 kg)   SpO2 94%   BMI 33.78 kg/m²     General appearance:  No apparent distress, appears stated age and cooperative. Afebrile  HEENT:  Normal cephalic, atraumatic without obvious deformity. Pupils equal, round, and reactive to light. Extra ocular muscles intact. Conjunctivae/corneas clear. Neck: Supple, with full range of motion. No jugular venous distention. Trachea midline. Respiratory:  Normal respiratory effort. Clear to auscultation, bilaterally without Rales/Wheezes/Rhonchi. Diminished breath sounds bilateral bases  Cardiovascular:  Regular rate and rhythm with normal S1/S2 without murmurs, rubs or gallops. Abdomen: Soft, non-tender, non-distended with normal bowel sounds. Musculoskeletal:  No clubbing, cyanosis or edema bilaterally. Full range of motion without deformity. Mild erythema  Skin: Skin color, texture, turgor normal.   Neurologic:  Neurovascularly intact without any focal sensory/motor deficits. Cranial nerves: II-XII intact, grossly non-focal.  Psychiatric:  Alert and oriented to self but not to the month, year or the place. According to the daughter this is her baseline. Does not interact or follow commands. Capillary Refill: Brisk,< 3 seconds   Peripheral Pulses: +2 palpable, equal bilaterally       Labs:     Recent Labs     06/25/21  1731   WBC 6.6   HGB 14.5   HCT 43.1        Recent Labs     06/25/21  1731      K 4.2   CL 98*   CO2 27   BUN 20   CREATININE 1.1   CALCIUM 9.8     Recent Labs     06/25/21  1731   AST 19   ALT 18   BILITOT 0.6   ALKPHOS 100     Recent Labs     06/25/21  1731   INR 1.02     Recent Labs     06/25/21  1731   TROPONINI 0.02*       Urinalysis:      Lab Results   Component Value Date    NITRU Negative 06/25/2021    WBCUA 3-5 06/25/2021    BACTERIA Rare 10/19/2020    RBCUA 0-2 06/25/2021    BLOODU Negative 06/25/2021    SPECGRAV 1.015 06/25/2021    GLUCOSEU Negative 06/25/2021       Radiology:   EKG:  I have reviewed the EKG with the following interpretation: Sinus rhythm, LVH, PACs-occasional, no acute ST-T changes. CT HEAD WO CONTRAST   Final Result      Chronic atrophic changes of the brain without acute hemorrhage, edema or significant change.  Low attenuation the periventricular white matter and symmetric ventricular dilation are stable. No midline shift or hydrocephalus. Prior cataract surgery noted. No subdural or epidural hematoma. XR CHEST (2 VW)   Final Result   1. No acute cardiopulmonary abnormality. 2. Large hiatal hernia. 3. Severe kyphoscoliotic curvature the spine. ASSESSMENT:    Active Hospital Problems    Diagnosis Date Noted    AMS (altered mental status) [R41.82] 06/25/2021   - Altered mental state/ Delirium-likely her baseline mental status due to dementia, confirmed by the daughter. UA negative for infection. CT head without acute bleed  - Elevated proBNP  - Elevated troponin  - CKD stage III, atrophic right kidney on CT  - Large hiatal hernia  - Severe left hip osteoarthritis      Plan  -Psychiatry consult, might need [facility] psych admission,  Consult  worker for possible placement, at least for respite  - Continue on statins  - Pharmacy consult for home medication verification  - Trend cardiac enzymes x2, if trending up will get cardiology consult  - Continue Lasix  current doses  - Strict intake and output  - Continue to monitor electrolytes and renal function  - Incentive spirometry  - Would not start on IV antibiotics at this time, will continue to follow clinically and if patient spikes fever or develops leukocytosis will start her on antibiotics  -check vit b12, tsh  - PT/OT  - Neurochecks every 4 hourly  - Fall precautions      DVT Prophylaxis: Lovenox  Diet: ADULT DIET; Regular; Low Sodium (2 gm)  Code Status: Full Code    PT/OT Eval Status: As tolerated, with assistance and fall precautions    Dispo -PCU with telemetry       Alethea Reynoso MD    Thank you Talon Hall for the opportunity to be involved in this patient's care. If you have any questions or concerns please feel free to contact me at [phone].

## 2021-06-26 NOTE — CONSULTS
79 yo female hx dementia, poor historian. Reviewed chart. Came to ER yesterday aft (but also saw cardiologist yesterday aft?)  Dtr reported about 2 wk hx becoming more disorganized, smearing stool, and starting to talk with  family. Unable to determine if has had previous similar episodes. Nothing in her records. Was taking no psychotropic medication. Last seen by pcp in March, . No alcohol. No documented episodes of delirium in past.     Patient struggles to recall any details. Frida Rivera my daughter. \"  On the floor here for under 24 hrs, but no agitation, psychosis, or disruptiveness. \"I feel fine. \"      Medical w/u so far negative for anything that could cause delirium. Urine showed large leuk est but otherwise negative, and cx so far is neg. Head ct does not reveal acute changes. VSS afeb. Soc:  Lives with dtr. Raised 5 kids. Dtr appears to be overwhelmed by her behaviors of late, not clear if can continue to care for her. MSE:  Pleasant, calm, interactive. Goal directed. Denies feeling sad, anxious. No paranoia. No halluc. No hs id. No speech slurring. O x hospital, not date or mo/yr. Poor recall, including details of past 24 hrs. Does not recall if used to work. All labs/results reviewed. Imp:  Dementia with recent b.d., rule out delirium. Psychosis nos. Recommend:  History and presentation are c/w delirium but no cause. Continue to monitor, as she was admitted less than 24 hrs ago. If she remains free of any symptoms another 24 hours, consider empirical 2 week trial of low dose risperidone, .5mg daily and f/u pcp asap. [facility] manifests severe symptoms here. I recommend transfer to [facility] under care of Dr. Lesvia Bruce, if this occurs. She can f/u in our practice if psychotic/behavioral sx recur but otherwise manageable in an outpatient setting.   [phone] to reach our intake department.

## 2021-06-26 NOTE — PROGRESS NOTES
Speech Language Pathology  Facility/Department: [place] [address] [facility] EVALUATION  discharge    NAME: Maribel Prater  : 1933  MRN: [id number]    ADMISSION DATE: 2021  ADMITTING DIAGNOSIS: has Altered mental state; Hypertension; Chronic heart failure with preserved ejection fraction (HFpEF) ([address] Severe mitral regurgitation; SVT (supraventricular tachycardia) ([address] and AMS (altered mental status) on their problem list.  ONSET DATE: 21    Recent Chest Xray 21  Impression   1. No acute cardiopulmonary abnormality. 2. Large hiatal hernia. 3. Severe kyphoscoliotic curvature the spine.           CT of head 21  Impression       Chronic atrophic changes of the brain without acute hemorrhage, edema or significant change. Low attenuation the periventricular white matter and symmetric ventricular dilation are stable.       No midline shift or hydrocephalus. Prior cataract surgery noted. No subdural or epidural hematoma. Previous MBS 10/19/21  Impressions:  Swallow WFL's. Swallow is timely and efficient. Flash penetration with spontaneous clearing occurred with thin via cup and straw, with small sips (normal for pt age). With consecutive swallows, no penetration occurred. No aspiration or pharyngeal residue was identified. Mastication of solids is adequate with effective clearance of oral cavity.  Recommend regular diet with thin liquids       Date of Eval: 2021  Evaluating Therapist: AMBAR Cai    Current Diet level:  Current Diet : Regular  Current Liquid Diet : Thin      Primary Complaint  Patient Complaint: pt is without complaints    Pain:  Pain Assessment  Pain Assessment: 0-10  Pain Level: 0    Reason for Referral  Maribel Prater was referred for a bedside swallow evaluation to assess the efficiency of her swallow function, identify signs and symptoms of aspiration and make recommendations regarding safe dietary consistencies, effective compensatory strategies, and safe eating environment. History of Present Illness      Daya Daniels is a 80 y.o. female who presents with altered mental status     History is limited by patient condition: Dementia. Patient has no complaints, and specifically denies any pain anywhere, headache, difficulty breathing.     Briana Gavin (Daughter) [phone]  Ms. Kyleigh Vicente provides the following history:      Ms. Kyleigh Vicente states that the patient lives at home with her. Over the last 2 weeks, the patient has had progressive change in behavior. She has begun spreading feces around the house, talking to people who have been dead for many years as if they are in the room. Ms. Kyleigh Vicente states that the patient has had no complaints except for intermittently complaining of bilateral hand pain. The symptoms wax and wane throughout the day without a clear trigger. There is been no fever, nausea, vomiting. The patient is began drinking well. There is been no change in her bowel or bladder habits, and spreading of feces is not related to an acute change in the content of her stools.     Of note, she states that she is the POA. She states that the patient remains a full code, although she would not want prolonged attempts but would want at least \"2 rounds\"    Impression  Pt was alert and oriented to self and place but not time. Pt was last seen in Oct 2020- at that time had MBS which was [facility]- recommended regular diet with thin liquids. On this date pt was pleasant and cooperative. ROM of oral structures was [facility]. Pt had no difficulty closing lips around spoon or drawing liquid up a straw. Pt had no dificulty with mastication with solids. There was no anterior spillage or oral residue noted with any consistency. Pt demonstrated no s/s aspiration with any consistency presented. Pt able to initiate swallow without difficulty with laryngeal movement observed. Vocal quality remained clear throughout.   No coughing, throat clearing or choking goal:pt denied difficulty with swallowing so did not state goal       Plan:  Swallowing appears to be [facility]. Pt with periods of confusion. CT of head is negative for acute change so nature of confusion does not appear to be neurological in nature. Pt discharged from [facility]  Recommended diet:regular with thin liquids   Total treatment time:15  Pt's discharge plan:to home   Discharge Plan: To be determined closer to discharge  Discussed with RNCe   Needs within reach.        Jhonny Lagos, [address] [facility] [place], [facility]- [address] [place] [address]  Pg # [phone]  This document will serve as a discharge summary if pt discharge before next treatment   session

## 2021-06-26 NOTE — CONSULTS
Clinical Pharmacy Progress Note  Pharmacy to assist with Home Medication information    Admit date: 6/25/2021     Subjective/Objective:  Patient is a 80yr old female from home admitted for pyschiatry consult. Pharmacy has been asked by Dr. Herbert Lee to assist with obtaining home medication information. Assessment/Plan:    1. Home Medication information:    · Called the patient's pharmacy, [facility]. Fills at [facility] correlate exactly with [facility] home med list and [facility] notes. · Home Medication List in Epic is complete. No changes made. Thank you, please call with questions.    Ji Ferrell PharmD., BCPS   6/26/2021 12:46 PM  Wireless: [phone]

## 2021-06-26 NOTE — PROGRESS NOTES
Hospitalist Progress Note      PCP: Dillon Walden    Chief Complaint. Presented to hospital for fall    Date of Admission: 6/25/2021    Subjective: lying in bed comfortably,  denies chest pain, nausea, vomiting, shortness of breath, fever or chills. mention feels overall better    Medications:  Reviewed    Infusion Medications    sodium chloride       Scheduled Medications    sodium chloride flush  5-40 mL Intravenous 2 times per day    enoxaparin  40 mg Subcutaneous Daily    carvedilol  6.25 mg Oral BID    furosemide  40 mg Oral Daily    levothyroxine  75 mcg Oral Daily    losartan  25 mg Oral Daily    pravastatin  40 mg Oral Nightly     PRN Meds: sodium chloride flush, sodium chloride, ondansetron **OR** ondansetron, polyethylene glycol, acetaminophen **OR** acetaminophen      Intake/Output Summary (Last 24 hours) at 6/26/2021 1206  Last data filed at 6/26/2021 1146  Gross per 24 hour   Intake 600 ml   Output --   Net 600 ml       Physical Exam Performed:    /61   Pulse 64   Temp 98.5 °F (36.9 °C) (Oral)   Resp 16   Ht 5' 5\" (1.651 m)   Wt 203 lb (92.1 kg)   SpO2 94%   BMI 33.78 kg/m²     General appearance: No apparent distress,   HEENT:  Conjunctivae/corneas clear. Neck: Supple, with full range of motion. Respiratory:  Normal respiratory effort. Clear to auscultation, bilaterally without Rales/Wheezes/Rhonchi. Cardiovascular: Regular rate and rhythm with normal S1/S2 without murmurs or rubs  Abdomen: Soft, non-tender, non-distended, normal bowel sounds. Musculoskeletal: No cyanosis or edema bilaterally  Neurologic:  without any focal sensory/motor deficits.  grossly non-focal.  Psychiatric: Alert and oriented x 1, Normal mood  Peripheral Pulses: +2 palpable, equal bilaterally       Labs:   Recent Labs     06/25/21  1731 06/26/21  0512   WBC 6.6 5.7   HGB 14.5 12.7   HCT 43.1 37.3    242     Recent Labs     06/25/21  1731 06/26/21  0512    139   K 4.2 3.9   CL 98* 102   CO2 27 26   BUN 20 17   CREATININE 1.1 0.9   CALCIUM 9.8 9.3     Recent Labs     06/25/21  1731   AST 19   ALT 18   BILITOT 0.6   ALKPHOS 100     Recent Labs     06/25/21  1731   INR 1.02     Recent Labs     06/26/21  0301 06/26/21  0512 06/26/21  0906   TROPONINI 0.05* 0.04* 0.06*       Urinalysis:      Lab Results   Component Value Date    NITRU Negative 06/25/2021    WBCUA 3-5 06/25/2021    BACTERIA Rare 10/19/2020    RBCUA 0-2 06/25/2021    BLOODU Negative 06/25/2021    SPECGRAV 1.015 06/25/2021    GLUCOSEU Negative 06/25/2021       Radiology:  CT HEAD WO CONTRAST   Final Result      Chronic atrophic changes of the brain without acute hemorrhage, edema or significant change. Low attenuation the periventricular white matter and symmetric ventricular dilation are stable. No midline shift or hydrocephalus. Prior cataract surgery noted. No subdural or epidural hematoma. XR CHEST (2 VW)   Final Result   1. No acute cardiopulmonary abnormality. 2. Large hiatal hernia. 3. Severe kyphoscoliotic curvature the spine. Assessment/Plan:    Active Hospital Problems    Diagnosis     AMS (altered mental status) [R41.82]      - Altered mental state-likely her baseline mental status due to dementia, confirmed by the daughter. UA negative for infection.   CT head without acute bleed  - Elevated proBNP  - Elevated troponin  - CKD stage III, atrophic right kidney on CT  - Large hiatal hernia  - Severe left hip osteoarthritis        Plan  Cardiology consulted for elevated troponin - patient ia chest pain free, monitor on cardiac tele  -Psychiatry consult, might need Caroline psych admission  Consult  worker for possible placement, at least for respite  - Continue on statins  - Continue Lasix  current doses  - Strict intake and output  - Continue to monitor electrolytes and renal function  - Incentive spirometry  - Would not start on IV antibiotics at this time, will continue to

## 2021-06-26 NOTE — PLAN OF CARE
Problem: Safety:  Goal: Ability to remain free from injury will improve  Description: Ability to remain free from injury will improve  Outcome: Ongoing

## 2021-06-27 PROCEDURE — 2580000003 HC RX 258: Performed by: INTERNAL MEDICINE

## 2021-06-27 PROCEDURE — 97116 GAIT TRAINING THERAPY: CPT

## 2021-06-27 PROCEDURE — 97161 PT EVAL LOW COMPLEX 20 MIN: CPT

## 2021-06-27 PROCEDURE — 6370000000 HC RX 637 (ALT 250 FOR IP): Performed by: INTERNAL MEDICINE

## 2021-06-27 PROCEDURE — 6360000002 HC RX W HCPCS: Performed by: INTERNAL MEDICINE

## 2021-06-27 PROCEDURE — 97166 OT EVAL MOD COMPLEX 45 MIN: CPT

## 2021-06-27 PROCEDURE — 2060000000 HC ICU INTERMEDIATE R&B

## 2021-06-27 PROCEDURE — 97535 SELF CARE MNGMENT TRAINING: CPT

## 2021-06-27 RX ADMIN — ENOXAPARIN SODIUM 40 MG: 40 INJECTION SUBCUTANEOUS at 08:21

## 2021-06-27 RX ADMIN — LOSARTAN POTASSIUM 25 MG: 25 TABLET, FILM COATED ORAL at 08:21

## 2021-06-27 RX ADMIN — LEVOTHYROXINE SODIUM 75 MCG: 0.07 TABLET ORAL at 05:21

## 2021-06-27 RX ADMIN — Medication 10 ML: at 20:56

## 2021-06-27 RX ADMIN — FUROSEMIDE 40 MG: 40 TABLET ORAL at 08:21

## 2021-06-27 RX ADMIN — CARVEDILOL 6.25 MG: 6.25 TABLET, FILM COATED ORAL at 08:21

## 2021-06-27 RX ADMIN — Medication 10 ML: at 08:22

## 2021-06-27 RX ADMIN — CARVEDILOL 6.25 MG: 6.25 TABLET, FILM COATED ORAL at 20:56

## 2021-06-27 RX ADMIN — PRAVASTATIN SODIUM 40 MG: 40 TABLET ORAL at 20:56

## 2021-06-27 ASSESSMENT — PAIN SCALES - GENERAL
PAINLEVEL_OUTOF10: 0

## 2021-06-27 NOTE — PLAN OF CARE
Problem: Falls - Risk of:  Goal: Will remain free from falls  Description: Will remain free from falls  Outcome: Ongoing   Pt has remained free of falls and injury thus far. Pt had one episode of confusion (see past note). Pt did use call light appropriately later. Pt ambulated with x1 assist and walker. Problem: Safety:  Goal: Free from accidental physical injury  Description: Free from accidental physical injury  Outcome: Ongoing     Problem: Pain:  Goal: Patient's pain/discomfort is manageable  Description: Patient's pain/discomfort is manageable  Outcome: Ongoing   Pt has remained free of pain and appears comfortable. Pt has intermittent moments of confusion. Pt has hx of dementia.

## 2021-06-27 NOTE — PROGRESS NOTES
Occupational Therapy   Occupational Therapy Initial Assessment/Tx Note/ Discharge Note  Date: 2021   Patient Name: Delvis Bernard  MRN: [id number]     : 1933    Date of Service: 2021    Discharge Recommendations: Delvis Bernard scored a 20/24 on the AM-PAC ADL Inpatient form. At this time, no further OT is recommended upon discharge due to patient performing ADL tasks/transfers at baseline levels and is demonstrating endurance levels adequate to safely complete all ADLs. Recommend patient returns to prior setting with prior services. Recommend 24hr A due to pre-existing dementia dx. OT Equipment Recommendations  Equipment Needed: No    Assessment   Assessment: Patient presenting from home where she lives with daughter. Patient demonstrated completion of ADL tasks/transfers with independence - SBA. Cueing and prompting for sequencing during transfers was beneficial. Patient completed LE dressing, toileting, grooming, and endurance for ADLs this date with SBA. Patient is completing ADL tasks and transfers at baseline levels and does not require skilled therapy at this time. No further OT needs at this time. Pt varbalizing she may d/c home today, if so- recommend 24hr A at d/c. Decision Making: Low Complexity  OT Education: OT Role  Barriers to Learning: Dementia at baseline  REQUIRES OT FOLLOW UP: No  Activity Tolerance  Activity Tolerance: Patient Tolerated treatment well  Safety Devices  Safety Devices in place: Yes  Type of devices: Gait belt;Patient at risk for falls; Left in chair;Nurse notified; Chair alarm in place; All fall risk precautions in place;Call light within reach           Patient Diagnosis(es): The encounter diagnosis was Altered mental status, unspecified altered mental status type. has a past medical history of Dementia ([facility] [address] has no past surgical history on file.            Restrictions  Position Activity Restriction  Other position/activity restrictions: up with assist    Subjective   General  Chart Reviewed: Yes  Additional Pertinent Hx: 79 yo F presenting from home where she lives with her daughter for AMS but has dementia at baseline, HCT (-), Presenting with elevated proBNP and troponin, CKD stage III, atrophic right kidney on CT, Large hiatal hernia, & Severe left hip osteoarthritis, Neg for UTI  Family / Caregiver Present: No  Referring Practitioner: Allen  Diagnosis: AMS  Subjective  Subjective: Patient resting upon arrival. Pleasant and agreeable to participate in therapy evaluation. Patient Currently in Pain: Denies  Vital Signs  Patient Currently in Pain: Denies  Social/Functional History  Social/Functional History  Lives With: Daughter  Type of Home: House  Home Layout: Multi-level, Able to Live on Main level with bedroom/bathroom  Home Access: Stairs to enter with rails (4-5)  Bathroom Shower/Tub: Walk-in shower  Bathroom Toilet: Handicap height  Bathroom Accessibility:  (walker doesn't fit in BR)  Home Equipment: Rolling walker, Cane, [employer], Sock aid, Long-handled shoehorn, Reacher  ADL Assistance:  (assist with dressing/bathing)  Homemaking Assistance:  (dtr performs)  Ambulation Assistance: Independent (with RW)  Transfer Assistance: Independent  Active : No  Occupation: Retired  Additional Comments: last admit 10/2020       Objective              Balance  Sitting Balance: Independent  Standing Balance: Contact guard assistance  Standing Balance  Time: 10 minutes totale  Activity: Ambulating in room, bathroom, and hallway for ADL tasks, transfers and ADL endurance training  Comment: CGA with RW  Toilet Transfers  Toilet - Technique: Ambulating  Toilet Transfer: Contact guard assistance  Toilet Transfers Comments: RW, Cueing for hand placement/sequencing  ADL  Grooming: Contact guard assistance  UE Dressing: Contact guard assistance;Verbal cueing  LE Dressing:  Moderate assistance (for doffing/donning socks and underwear)  Toileting: Contact guard assistance (for pericare)  Additional Comments: using RW for ADL tasks/transfers  Tone RUE  RUE Tone: Normotonic  Tone LUE  LUE Tone: Normotonic  Coordination  Movements Are Fluid And Coordinated: No     Bed mobility  Supine to Sit: Supervision (from flat bed)  Comment: Patient in chair at end of session  Transfers  Sit to stand: Contact guard assistance (from toilet, from EOB)  Stand to sit: Contact guard assistance (to toilet, to chair)  Transfer Comments: RW, Cueing for hand placement/sequencing     Cognition  Overall Cognitive Status: Exceptions  Memory: Decreased recall of biographical Information;Decreased recall of recent events  Cognition Comment: Dementia at baseline                                        Plan        No acute OT needs. Will d/c from OT services IP. AM-PAC Score        AM-PAC Inpatient Daily Activity Raw Score: 20 (06/27/21 1041)  AM-PAC Inpatient ADL T-Scale Score : 42.03 (06/27/21 1041)  ADL Inpatient CMS 0-100% Score: 38.32 (06/27/21 1041)  ADL Inpatient CMS G-Code Modifier : CJ (06/27/21 1041)    Goals  NA - pt without acute OT needs at this time.        Therapy Time   Individual Concurrent Group Co-treatment   Time In 0835         Time Out 0905         Minutes 30         Timed Code Treatment Minutes: [address], [place] OTR/L, 8860

## 2021-06-27 NOTE — PROGRESS NOTES
Pt confused upon awakening and attempted to \"get out of bed to go to bed\". Pt aware and person and place but confused to time and situation still. Pt easily re oriented. Will continue to monitor. Bed alarm in place.   Electronically signed by Benny Charles RN on 6/26/2021 at 11:43 PM

## 2021-06-27 NOTE — PROGRESS NOTES
Physical Therapy    Facility/Department: [place] [address] PCU  Initial Assessment/discharge note      NAME: Maribel Prater  : 1933  MRN: [id number]    Date of Service: 2021    Discharge Recommendations:Nancy Romero scored a 22/24 on the AM-PAC short mobility form. Current research shows that an AM-PAC score of 18 or greater is typically associated with a discharge to the patient's home setting. Please see assessment section for further patient specific details. PT Equipment Recommendations  Equipment Needed: No    Assessment   Assessment: 79 yo admitted 21 for AMS. Pt demo mobility at or close to her functional baseline. Per notes, pt has cognitive/memory deficits at baseline. Pt states she hopes to return home today with daughter. If home,recommend 24 hour supervision initially,use of rolling walker (has). No further acute PT needs so will sign off. Recommend nursing staff continue to encourage ambulation PRN and often with RW and SBA. Treatment Diagnosis: mobility impairment due to AMS  Decision Making: Low Complexity  Patient Education: Pt educated on PT role, importance of OOB mobility, need to call for maria elena to get up and she verbalized understanding but will need reinforcement. REQUIRES PT FOLLOW UP: No  Activity Tolerance  Activity Tolerance: Patient Tolerated treatment well;Patient limited by cognitive status       Patient Diagnosis(es): The encounter diagnosis was Altered mental status, unspecified altered mental status type. has a past medical history of Dementia ([address] has no past surgical history on file. Restrictions  Position Activity Restriction  Other position/activity restrictions: up with assist     Vision/Hearing  Vision: Within Functional Limits  Hearing: Exceptions to [facility]  Hearing Exceptions: Hard of hearing/hearing concerns       Subjective  General  Chart Reviewed: Yes  Additional Pertinent Hx: 80 y.o. female who presented to ED 21 with altered mental status/fall. Psych consult; head CT/CXR neg. Pmhx: dementia. Family / Caregiver Present: No  Diagnosis: fall/AMS  Follows Commands: Within Functional Limits  Subjective  Subjective: Pt found supine in bed and agreeable to PT. \" I'm hoping to go home today.  \"  Pain Screening  Patient Currently in Pain: Denies         Orientation  Orientation  Overall Orientation Status:  (oriented to name/, place, month/year)     Social/Functional History  Social/Functional History  Lives With: Daughter  Type of Home: House  Home Layout: Multi-level, Able to Live on Main level with bedroom/bathroom  Home Access: Stairs to enter with rails (4-5)  Bathroom Shower/Tub: Walk-in shower  Bathroom Toilet: Handicap height  Bathroom Accessibility:  (walker doesn't fit in BR)  Home Equipment: Rolling walker, Cane, Wheelchair-manual, Sock aid, Long-handled shoehorn, Reacher  ADL Assistance:  (assist with dressing/bathing)  Homemaking Assistance:  (dtr performs)  Ambulation Assistance: Independent (with RW)  Transfer Assistance: Independent  Active : No  Occupation: Retired  Additional Comments: last admit 10/2020       Objective          AROM RLE (degrees)  RLE AROM: WFL  AROM LLE (degrees)  LLE AROM : WFL     Strength RLE  Strength RLE: WFL  Strength LLE  Strength LLE: WFL        Bed mobility  Supine to Sit: Supervision (from flat bed)  Scooting: Modified independent     Transfers  Sit to Stand: Supervision (x2 trials)  Stand to sit: Supervision (x2 trials)     Ambulation  Device: Rolling Walker  Assistance: Contact guard assistance (progressing to supervision)  Quality of Gait: forward posture with quick aranza; vc to stay up closer to walker; no overt LOB noted  Distance: 200 ft, 20 ft, 10 ft              Plan   Safety Devices  Type of devices: Nurse notified, Call light within reach, Chair alarm in place, Left in chair                        AM-PAC Score  AM-PAC Inpatient Mobility Raw Score : 22 (21 1031)  AM-PAC Inpatient T-Scale Score : 53.28 (06/27/21 1031)  Mobility Inpatient CMS 0-100% Score: 20.91 (06/27/21 1031)  Mobility Inpatient CMS G-Code Modifier : CJ (06/27/21 1031)       Therapy Time   Individual Concurrent Group Co-treatment   Time In 0835         Time Out 0900         Minutes 25           Timed Code Treatment Minutes:15       Total Treatment Minutes:  25       Garret Twin, PT

## 2021-06-27 NOTE — DISCHARGE INSTR - COC
Continuity of Care Form    Patient Name: Joni Chambers   :  1933  MRN:  [id number]    Admit date:  2021  Discharge date: 21    Code Status Order: Full Code   Advance Directives:      Admitting Physician:  Tawana Carmona MD  PCP: Freda Santiago    Discharging Nurse: Mary Henning RN  [address] Unit/Room#: 3118/2808-38  Discharging Unit Phone Number: [phone]    Emergency Contact:   Extended Emergency Contact Information  Primary Emergency Contact: Meghana Romero  Home Phone: [phone]  Relation: Unknown  Secondary Emergency Contact: Gustavo Hyatt  Mobile Phone: [phone]  Relation: Child    Past Surgical History:  History reviewed. No pertinent surgical history.     Immunization History:   Immunization History   Administered Date(s) Administered    Pneumococcal Conjugate 13-valent ([id number]) 2015    Pneumococcal Polysaccharide (Inoqahsno83) 2001, 2018    Tdap (Boostrix, Adacel) 2015       Active Problems:  Patient Active Problem List   Diagnosis Code    Altered mental state R41.82    Hypertension I10    Chronic heart failure with preserved ejection fraction (HFpEF) ([facility]) I50.32    Severe mitral regurgitation I34.0    SVT (supraventricular tachycardia) ([facility]) I47.1    AMS (altered mental status) R41.82       Isolation/Infection:   Isolation            No Isolation          Patient Infection Status       None to display            Nurse Assessment:  Last Vital Signs: /61   Pulse 72   Temp 97.1 °F (36.2 °C)   Resp 16   Ht 5' 5\" (1.651 m)   Wt 203 lb (92.1 kg)   SpO2 95%   BMI 33.78 kg/m²     Last documented pain score (0-10 scale): Pain Level: 0  Last Weight:   Wt Readings from Last 1 Encounters:   21 203 lb (92.1 kg)     Mental Status:  oriented    IV Access:  - None    Nursing Mobility/ADLs:  Walking   Assisted  Transfer  Assisted  Bathing  Assisted  Dressing  Assisted  Toileting  Assisted  Feeding  Independent  Med Admin  Assisted  Med Delivery   whole    Wound Care Documentation and Therapy:  Wound 10/17/20 Buttocks blanchable redness along with purple discoloration resembling bruise or possibly deep tissue injury (Active)   Number of days: 252       Wound 10/17/20 Groin Anterior;Right;Left excoriation and fungal rash to groin folds (Active)   Number of days: 252       Wound 10/17/20 Pretibial Right;Left redness, inflamed, cellulitis (Active)   Number of days: 252       Wound 10/17/20 Pretibial Left; Inner small open area; cellulitis; ulceration (Active)   Number of days: 252        Elimination:  Continence:   · Bowel: No  · Bladder: No  Urinary Catheter: None   Colostomy/Ileostomy/Ileal Conduit: No       Date of Last BM: passing gas today    Intake/Output Summary (Last 24 hours) at 6/27/2021 1050  Last data filed at 6/26/2021 1146  Gross per 24 hour   Intake 360 ml   Output --   Net 360 ml     I/O last 3 completed shifts: In: 360 [P.O.:360]  Out: -     Safety Concerns:     None    Impairments/Disabilities:      None    Nutrition Therapy:  Current Nutrition Therapy:   - Oral Diet:  General    Routes of Feeding: Oral  Liquids: Thin Liquids  Daily Fluid Restriction: no  Last Modified Barium Swallow with Video (Video Swallowing Test): not done    Treatments at the Time of Hospital Discharge:   Respiratory Treatments: ***  Oxygen Therapy:  is not on home oxygen therapy.   Ventilator:    - No ventilator support    Rehab Therapies: Physical Therapy  Weight Bearing Status/Restrictions: No weight bearing restirctions  Other Medical Equipment (for information only, NOT a DME order):  walker  Other Treatments: ***    Patient's personal belongings (please select all that are sent with patient):  None    RN SIGNATURE:  Electronically signed by Ashwin Thomas RN on 6/28/21 at 4:37 PM EDT    CASE MANAGEMENT/SOCIAL WORK SECTION    Inpatient Status Date: ***    Readmission Risk Assessment Score:  Readmission Risk              Risk of Unplanned Readmission:  10           Discharging to Facility/ Agency   · Name:   · Address:  · Phone:  · Fax:    Dialysis Facility (if applicable)   · Name:  · Address:  · Dialysis Schedule:  · Phone:  · Fax:    / signature: {Esignature:[id number]:::0}    PHYSICIAN SECTION    Prognosis: Fair    Condition at Discharge: Stable    Rehab Potential (if transferring to Rehab): Fair    Recommended Labs or Other Treatments After Discharge: CBC, BMP in 2 days    Physician Certification: I certify the above information and transfer of Jose M Todd  is necessary for the continuing treatment of the diagnosis listed and that she requires Home Care for greater 30 days.      Update Admission H&P: No change in H&P    PHYSICIAN SIGNATURE:  Electronically signed by Joan Hurtado MD on 6/28/21 at 12:37 PM EDT

## 2021-06-27 NOTE — CARE COORDINATION
Case Management Assessment           Initial Evaluation                Date / Time of Evaluation: 6/27/2021 2:44 PM                 Assessment Completed by: Vanessa Espinal RN    Patient Name: Jose Luna     YOB: 1933  Diagnosis: AMS (altered mental status) [R41.82]     Date / Time: 6/25/2021  4:33 PM    Patient Admission Status: Inpatient    If patient is discharged prior to next notation, then this note serves as note for discharge by case management. Current PCP: Jose Angel Lechuga Patient: No    Chart Reviewed: Yes  Patient/ Family Interviewed: Yes    Initial assessment completed at bedside with: patient who is pleasantly confused and son over the phone, Yanelis Duval. Hospitalization in the last 30 days: No    Emergency Contacts:  Extended Emergency Contact Information  Primary Emergency Contact: Meghana Romero  Drums Phone: [phone]  Relation: Unknown  Secondary Emergency Contact: Kezia [employer] Phone: [phone]  Relation: Child    Advance Directives:   Code Status: Full [address]: Yes  Agent: Angel Taveras  Contact Number: [phone]    Copy present: Yes     In paper Chart: No    Scanned into EMR Yes    Financial  Payor: MEDICARE / Plan: MEDICARE PART A AND B / Product Type: *No Product type* /     Pre-cert required for SNF: [facility] [address] [place] [address]  Phone: [phone] Fax: [phone]      Potential assistance Purchasing Medications: Potential Assistance Purchasing Medications: No  Does Patient want to participate in local refill/ meds to beds program?: No    Meds To Beds General Rules:  1. Can ONLY be done Monday- Friday between 8:30am-5pm  2. Prescription(s) must be in pharmacy by 3pm to be filled same day  3. Copy of patient's insurance/ prescription drug card and patient face sheet must be sent along with the prescription(s)  4. Cost of Rx cannot be added to hospital bill. If financial assistance is needed, please contact unit  or ;  or  CANNOT provide pharmacy voucher for patients co-pays  5. Patients can then  the prescription on their way out of the hospital at discharge, or pharmacy can deliver to the bedside if staff is available. (payment due at time of pick-up or delivery - cash, check, or card accepted)     Able to afford home medications/ co-pay costs: Yes    ADLS  Support Systems: Children, Family Members    PT AM-PAC: 22 /24  OT AM-PAC: 20 /24    [place]: From home with daughter      Plans to RETURN to current housing: No  Barriers to RETURNING to current housing: medical and social complications    Home Care Information  Currently ACTIVE with [address]: No  Home Care Agency: Not Applicable    Currently ACTIVE with [facility]: No  Passport/ Waiver: No      Durable Medical Equipment  DME Provider: n/a  Equipment: walker    Home Oxygen and [address] prior to admission: No  [address]: Not Applicable    DISCHARGE PLAN:  Disposition: [facility] (LTC): [facility]   Phone: [phone]  Fax: [phone]    Transportation PLAN for discharge: family     Factors facilitating achievement of predicted outcomes: Family support, Cooperative, Pleasant and Has needed Durable Medical Equipment at home    Barriers to discharge: Confusion, Cognitive deficit, Medical complications and Medication managment    Additional Case Management Notes: CM following for discharge planning. Pt is from home with daughter Gayathri Beaver. Pt with increased confusion and daughter works and unable to provide 24 hour supervision. Cm spoke to son Jackie Alexander, he has placed a call to [employer] and understands it may be private pay if pt does not qualify for SNF. No precert needed.  CM sent referral to [facility] and spoke with Milind Haque over the phone, she will review and get back to [facility] department. HENS needed and transport needed. Family states they may be able to provide transport. The Plan for Transition of Care is related to the following treatment goals of AMS (altered mental status) [R41.82]    The Patient and/or patient representative Elvira and her family were provided with a choice of provider and agrees with the discharge plan Yes    Freedom of choice list was provided with basic dialogue that supports the patient's individualized plan of care/goals and shares the quality data associated with the providers.  Yes    Care Transition patient: No    Luther Painter RN  [facility] [email or web address]  Case Management Department  Ph: [phone]   Fax: [phone]

## 2021-06-27 NOTE — PROGRESS NOTES
AST 19   ALT 18   BILITOT 0.6   ALKPHOS 100     Recent Labs     06/25/21  1731   INR 1.02     Recent Labs     06/26/21  0906 06/26/21  1205 06/26/21  1455   TROPONINI 0.06* 0.04* 0.04*       Urinalysis:      Lab Results   Component Value Date    NITRU Negative 06/25/2021    WBCUA 3-5 06/25/2021    BACTERIA Rare 10/19/2020    RBCUA 0-2 06/25/2021    BLOODU Negative 06/25/2021    SPECGRAV 1.015 06/25/2021    GLUCOSEU Negative 06/25/2021       Radiology:  CT HEAD WO CONTRAST   Final Result      Chronic atrophic changes of the brain without acute hemorrhage, edema or significant change. Low attenuation the periventricular white matter and symmetric ventricular dilation are stable. No midline shift or hydrocephalus. Prior cataract surgery noted. No subdural or epidural hematoma. XR CHEST (2 VW)   Final Result   1. No acute cardiopulmonary abnormality. 2. Large hiatal hernia. 3. Severe kyphoscoliotic curvature the spine. Assessment/Plan:    Active Hospital Problems    Diagnosis     AMS (altered mental status) [R41.82]      - Altered mental state-likely her baseline mental status due to dementia, confirmed by the daughter. UA negative for infection.   CT head without acute bleed  - Elevated proBNP  - Elevated troponin  - CKD stage III, atrophic right kidney on CT  - Large hiatal hernia  - Severe left hip osteoarthritis        Plan  -Psychiatry consult - stable for discharge, no inpt psych needs  Consult  worker for possible placement, at least for respite  - Continue on statins  - Continue Lasix  current doses  - Strict intake and output  - Continue to monitor electrolytes and renal function  - Incentive spirometry  - Would not start on IV antibiotics at this time, will continue to follow clinically and if patient spikes fever or develops leukocytosis will start her on antibiotics  -check vit b12, tsh  - PT/OT  - Neurochecks every 4 hourly  - Fall precautionsDiet: ADULT DIET; Regular;  Low Sodium (2 gm)  Code Status: Full Code    PT/OT Eval Status: ordered    Dispo - stable for discharge    Sabrina Diez MD

## 2021-06-28 VITALS
TEMPERATURE: 97.1 F | BODY MASS INDEX: 33.82 KG/M2 | DIASTOLIC BLOOD PRESSURE: 72 MMHG | WEIGHT: 203 LBS | OXYGEN SATURATION: 96 % | SYSTOLIC BLOOD PRESSURE: 142 MMHG | RESPIRATION RATE: 18 BRPM | HEIGHT: 65 IN | HEART RATE: 58 BPM

## 2021-06-28 PROCEDURE — 6360000002 HC RX W HCPCS: Performed by: INTERNAL MEDICINE

## 2021-06-28 PROCEDURE — 6370000000 HC RX 637 (ALT 250 FOR IP): Performed by: INTERNAL MEDICINE

## 2021-06-28 PROCEDURE — 2580000003 HC RX 258: Performed by: INTERNAL MEDICINE

## 2021-06-28 PROCEDURE — 6360000004 HC RX CONTRAST MEDICATION: Performed by: INTERNAL MEDICINE

## 2021-06-28 RX ADMIN — Medication 10 ML: at 08:53

## 2021-06-28 RX ADMIN — LEVOTHYROXINE SODIUM 75 MCG: 0.07 TABLET ORAL at 06:38

## 2021-06-28 RX ADMIN — ENOXAPARIN SODIUM 40 MG: 40 INJECTION SUBCUTANEOUS at 08:52

## 2021-06-28 RX ADMIN — LOSARTAN POTASSIUM 25 MG: 25 TABLET, FILM COATED ORAL at 08:52

## 2021-06-28 RX ADMIN — CARVEDILOL 6.25 MG: 6.25 TABLET, FILM COATED ORAL at 08:52

## 2021-06-28 RX ADMIN — PERFLUTREN 1.65 MG: 6.52 INJECTION, SUSPENSION INTRAVENOUS at 10:00

## 2021-06-28 RX ADMIN — FUROSEMIDE 40 MG: 40 TABLET ORAL at 08:52

## 2021-06-28 ASSESSMENT — PAIN SCALES - GENERAL
PAINLEVEL_OUTOF10: 0

## 2021-06-28 NOTE — CARE COORDINATION
CM spoke with [facility] this AM, they are not able to meet the needs of patient at this time. CHRIS spoke with both the daughter and the son, they are looking into [facility], CHRIS spoke with Marla Montes at [facility], she is on her way to the hospital for an onsite visit for the patient. She has also asked for referral to be sent to [facility] for clinical review. CM has faxed referral.    Family able to provide payment for first month up front and then can work with facility for further financial details. Patients son is currently out of town, but can make payment via check or credit card he reports, CHRIS has updated Marla Montes in admissions of this. She reports she will follow up with CM once she meets with patient. For direct admission to the memory care unit, patient would need COVID Vaccine per Braxton Peoples has not been able to locate in the chart, CM will check with family on current status. 4:11 PM  CM spoke with patients son who now reports that he wants patient to go to [facility] instead of [facility]. CHRIS spoke with Laz Carlos in admissions, she will come do on-site and meet with patient for assessment. They are able to accept the patient for admission and have a bed available today for patient to come to. CHRIS updated daughter Luci Cook, who is awaiting call from [facility],  and she will be signing paperwork for patient to come to facility and then will  patient this evening and transport patient to [facility], around 6pm.    RN to call report to [phone].     Thank you,  Michela Anton RN,   4th Floor Progressive Care Unit  [phone]

## 2021-06-28 NOTE — PROGRESS NOTES
Physician Progress Note      PATIENT:               Apurva Lester  CSN #:                  [id number]  :                       1933  ADMIT DATE:       2021 4:33 PM  DISCH DATE:  RESPONDING  PROVIDER #:        Danielle Huber MD          QUERY TEXT:    Pt admitted  with worsening AMS d/t dementia. Pt noted to have PMH   diastolic CHF, continues on Coreg, Lasix, Cozaar here. If possible, please   document in progress notes and discharge summary if you are evaluating and/or   treating any of the following: The medical record reflects the following:  Risk Factors: pAF, HTN  Clinical Indicators: HFpEF per PMH, ECHO 2020: EF: 55-60%, G2DD  Treatment: Continues on home dose of Coreg, Lasix, Cozaar  Options provided:  -- Chronic Diastolic CHF/HFpEF  -- Other - I will add my own diagnosis  -- Disagree - Not applicable / Not valid  -- Disagree - Clinically unable to determine / Unknown  -- Refer to Clinical Documentation Reviewer    PROVIDER RESPONSE TEXT:    This patient has chronic diastolic CHF/HFpEF. Query created by:  Amadou Grullon on 2021 11:19 AM      Electronically signed by:  Danielle Huber MD 2021 5:49 PM

## 2021-06-28 NOTE — PLAN OF CARE
Problem: Falls - Risk of:  Goal: Will remain free from falls  Description: Will remain free from falls  6/28/2021 1057 by Washington Sheikh RN  Outcome: Met This Shift  Note: Side rails up x 2 call in reach bed in low  position bed / chair alarm on when in use  gait belt on with SBA to BR     Problem: Falls - Risk of:  Goal: Absence of physical injury  Description: Absence of physical injury  Outcome: Met This Shift     Problem: Safety:  Goal: Ability to remain free from injury will improve  Description: Ability to remain free from injury will improve  Outcome: Met This Shift  Note: LOC x 3 not sure of current year, cooperative appropriate behavior     Problem: Safety:  Goal: Free from accidental physical injury  Description: Free from accidental physical injury  Outcome: Met This Shift     Problem: Safety:  Goal: Free from intentional harm  Description: Free from intentional harm  Outcome: Met This Shift     Problem: Infection:  Goal: Will remain free from infection  Description: Will remain free from infection  Outcome: Met This Shift  Note: Vss no fever     Problem: Daily Care:  Goal: Daily care needs are met  Description: Daily care needs are met  Outcome: Met This Shift  Note: ADL's with assist     Problem: Pain:  Goal: Patient's pain/discomfort is manageable  Description: Patient's pain/discomfort is manageable  Outcome: Met This Shift  Note: Denies any pain     Problem: Skin Integrity:  Goal: Skin integrity will stabilize  Description: Skin integrity will stabilize  6/28/2021 1057 by Washington Sheikh RN  Outcome: Met This Shift  Note: Cont to monitor see 4 eye assessment, , ; incont.  Of urine at times faustino care after each episode    Problem: Discharge Planning:  Goal: Patients continuum of care needs are met  Description: Patients continuum of care needs are met  Outcome: Met This Shift  Note: SNF vs Home with daughter

## 2021-06-28 NOTE — PROGRESS NOTES
D/C tel, rt. F/A SL , dressed awaiting daughter for transport to [facility]

## 2021-06-28 NOTE — DISCHARGE SUMMARY
Hospital Discharge Summary    Patient's PCP: Talon Hall  Admit Date: 6/25/2021   Discharge Date: 6/28/2021    Admitting Physician: Dr. Alethea Reynoso MD  Discharge Physician: Dr. Terrance Miranda MD   Consults: psychiatry     HPI: 80 y.o. female for the past medical history of advanced dementia, hypertension, heart failure preserved ejection fraction, severe MR, SVT who is brought in by daughter for mental status changes going on for the last 3 weeks. History obtained from ER provider as well as daughter. As per daughter patient has advanced dementia but for the last 1 month she has acute mental status changes like she is throwing poop on the floor , smearing shower,  rosales with feces. Daughter called PCP who suggested to come to the ER. Daughter appeared distressed. Patient has an appointment with Dr. Shahid Salmeron, daughter brought her to the ER from cardiology office to save the  trip. She is denying any fever no chills, no sick contacts for the patient  At the time of my examination patient is pleasantly confused, no complaint no chest pain no difficulty breathing she is alert and oriented only to self.      ED work-up:-CT head unremarkable, chest x ray  Unremarkable, elevated troponin II 0.02 but no ischemia on EKG, urine with leuk esterases        Brief hospital course:  Given the concern of the patients presentation and the concern of the possible multi-factorial etiology contributing to patients symptomatology.  Patient was admitted and evaluated and found to have:     Discharge Diagnoses:    AMS (altered mental status) [R41.82]        Altered mental state  Advanced dementia  - UA negative for infection.  CT head without acute bleed  -Vit b12, 455  - TSH 3.37  - Likely her baseline mental status due to dementia, confirmed by the daughter.    -Psychiatry consulted - stable for discharge, no inpt psych needs        Hypertension: Continue home meds     Chronic Heart failure preserved ejection fraction  Severe MR  - Stable, not fluid overloaded at this time  - Elevated proBNP likely sec to CKD  - Elevated troponin likely sec to CKD  - Continue home lasix  - Continue on statins  - Continue Lasix  current doses  - Strict intake and output  - Continue to monitor electrolytes and renal function        - CKD stage III, atrophic right kidney on CT: Monitor renal fxn routinely  - Severe left hip osteoarthritis: Analgesics                    Physical Exam: BP (!) 142/72   Pulse 58   Temp 97.1 °F (36.2 °C) (Oral)   Resp 18   Ht 5' 5\" (1.651 m)   Wt 203 lb (92.1 kg)   SpO2 96%   BMI 33.78 kg/m²     Recent Labs     06/26/21  0819   POCGLU 94       General appearance: NAD  HEENT:  Conjunctivae/corneas clear. Neck: Supple, with full range of motion. Respiratory:  Normal respiratory effort. Clear to auscultation, bilaterally . Cardiovascular: Regular rate and rhythm with normal S1/S2 without murmurs or rubs  Abdomen: Soft, non-tender, non-distended, normal bowel sounds. Musculoskeletal: No cyanosis or edema bilaterally  Neurologic:  without any focal sensory/motor deficits.  grossly non-focal.  Psychiatric: Alert and oriented x 1, Normal mood  Peripheral Pulses: +2 palpable, equal bilaterally       LABS:  Recent Labs     06/26/21  0512   WBC 5.7   HGB 12.7         Recent Labs     06/26/21  0512      K 3.9      CO2 26   BUN 17   CREATININE 0.9   GLUCOSE 89     Recent Labs     06/25/21  1731   INR 1.02         Discharge Medications:   Shiraz Romero Medication Instructions FAH:[id number]    Printed on:06/28/21 8102   Medication Information                      aspirin EC 81 MG EC tablet  Take 1 tablet by mouth daily             carvedilol (COREG) 6.25 MG tablet  Take 1 tablet by mouth 2 times daily             furosemide (LASIX) 40 MG tablet  Take 1 tablet by mouth daily             levothyroxine (SYNTHROID) 75 MCG tablet  Take 75 mcg by mouth Daily             losartan (COZAAR) 25 MG tablet  Take 1 tablet by mouth daily             Multiple Vitamins-Minerals (THERAPEUTIC MULTIVITAMIN-MINERALS) tablet  Take 1 tablet by mouth nightly              potassium chloride (KLOR-CON M) 20 MEQ extended release tablet  Take 1 tablet by mouth daily             pravastatin (PRAVACHOL) 20 MG tablet  Take 40 mg by mouth nightly                 Activity: activity as tolerated  Diet: cardiac diet  Wound Care: none needed    Disposition: SNF  Discharged Condition: Stable  Follow Up: Primary Care Physician in one week    Total time spent on discharge, finalizing medications, referrals and arranging outpatient follow up was more than 30 minutes    Thank you Dr. Bernard Lyles for the opportunity to be involved in this patients care. If you have any questions or concerns please feel free to contact me at [phone].

## 2021-06-28 NOTE — PLAN OF CARE
Problem: Falls - Risk of:  Goal: Will remain free from falls  Description: Will remain free from falls  Outcome: Ongoing  Note: Patient remains free of falls thus far this shift. Patient is alert and oriented x4. Patient utilizes call light appropriately to call for assistance with no attempts made at self transfer. Currently resting in bed quietly. Safety precautions include fall armband, fall blanket to bed, bed alarm, and non slip footwear. Bed in lowest position, bed wheels locked, bed alarm on, side rails up 2/4 and call light within reach. Will continue to monitor. Problem: Skin Integrity:  Goal: Skin integrity will stabilize  Description: Skin integrity will stabilize  Outcome: Ongoing  Note: No new skin integrity issues noted at this time. Patient continent of bowel and bladder. Repositions self while in bed. Currently resting in bed quietly. Will continue to monitor.

## 2021-06-28 NOTE — PROGRESS NOTES
Hospitalist Progress Note      PCP: Ismael Larson    Chief Complaint. Presented to hospital for fall    Date of Admission: 6/25/2021    Subjective: lying in bed comfortably, NAEON, denies chest pain, nausea, vomiting, shortness of breath, fever or chills. mention feels overall better    Medications:  Reviewed    Infusion Medications    sodium chloride       Scheduled Medications    sodium chloride flush  5-40 mL Intravenous 2 times per day    enoxaparin  40 mg Subcutaneous Daily    carvedilol  6.25 mg Oral BID    furosemide  40 mg Oral Daily    levothyroxine  75 mcg Oral Daily    losartan  25 mg Oral Daily    pravastatin  40 mg Oral Nightly     PRN Meds: sodium chloride flush, sodium chloride, ondansetron **OR** ondansetron, polyethylene glycol, acetaminophen **OR** acetaminophen      Intake/Output Summary (Last 24 hours) at 6/28/2021 1235  Last data filed at 6/28/2021 1114  Gross per 24 hour   Intake 600 ml   Output 650 ml   Net -50 ml       Physical Exam Performed:    /70   Pulse 64   Temp 97.2 °F (36.2 °C) (Oral)   Resp 18   Ht 5' 5\" (1.651 m)   Wt 203 lb (92.1 kg)   SpO2 97%   BMI 33.78 kg/m²     General appearance: NAD  HEENT:  Conjunctivae/corneas clear. Neck: Supple, with full range of motion. Respiratory:  Normal respiratory effort. Clear to auscultation, bilaterally . Cardiovascular: Regular rate and rhythm with normal S1/S2 without murmurs or rubs  Abdomen: Soft, non-tender, non-distended, normal bowel sounds. Musculoskeletal: No cyanosis or edema bilaterally  Neurologic:  without any focal sensory/motor deficits.  grossly non-focal.  Psychiatric: Alert and oriented x 1, Normal mood  Peripheral Pulses: +2 palpable, equal bilaterally    Labs:   Recent Labs     06/25/21  1731 06/26/21  0512   WBC 6.6 5.7   HGB 14.5 12.7   HCT 43.1 37.3    242     Recent Labs     06/25/21  1731 06/26/21  0512    139   K 4.2 3.9   CL 98* 102   CO2 27 26   BUN 20 17 CREATININE 1.1 0.9   CALCIUM 9.8 9.3     Recent Labs     06/25/21  1731   AST 19   ALT 18   BILITOT 0.6   ALKPHOS 100     Recent Labs     06/25/21  1731   INR 1.02     Recent Labs     06/26/21  0906 06/26/21  1205 06/26/21  1455   TROPONINI 0.06* 0.04* 0.04*       Urinalysis:      Lab Results   Component Value Date    NITRU Negative 06/25/2021    WBCUA 3-5 06/25/2021    BACTERIA Rare 10/19/2020    RBCUA 0-2 06/25/2021    BLOODU Negative 06/25/2021    SPECGRAV 1.015 06/25/2021    GLUCOSEU Negative 06/25/2021       Radiology:  CT HEAD WO CONTRAST   Final Result      Chronic atrophic changes of the brain without acute hemorrhage, edema or significant change. Low attenuation the periventricular white matter and symmetric ventricular dilation are stable. No midline shift or hydrocephalus. Prior cataract surgery noted. No subdural or epidural hematoma. XR CHEST (2 VW)   Final Result   1. No acute cardiopulmonary abnormality. 2. Large hiatal hernia. 3. Severe kyphoscoliotic curvature the spine. Assessment/Plan:    Active Hospital Problems    Diagnosis     AMS (altered mental status) [R41.82]      Altered mental state  Advanced dementia  - UA negative for infection.   CT head without acute bleed  -Vit b12, 455  - TSH 3.37  - Likely her baseline mental status due to dementia, confirmed by the daughter.    -Psychiatry consulted - stable for discharge, no inpt psych needs      Hypertension: Continue home meds    Chronic Heart failure preserved ejection fraction  Severe MR  - Stable, not fluid overloaded at this time  - Elevated proBNP likely sec to CKD  - Elevated troponin likely sec to CKD  - Continue home lasix  - Continue on statins  - Continue Lasix  current doses  - Strict intake and output  - Continue to monitor electrolytes and renal function      - CKD stage III, atrophic right kidney on CT: Monitor renal fxn routinely  - Severe left hip osteoarthritis: Analgesics       - Fall precautionsDiet: ADULT DIET; Regular;  Low Sodium (2 gm)  Code Status: Full Code    PT/OT Eval Status: ordered    Dispo - stable for discharge      Christopher Wylie MD